# Patient Record
Sex: MALE | Race: WHITE | NOT HISPANIC OR LATINO | Employment: OTHER | URBAN - METROPOLITAN AREA
[De-identification: names, ages, dates, MRNs, and addresses within clinical notes are randomized per-mention and may not be internally consistent; named-entity substitution may affect disease eponyms.]

---

## 2017-05-23 DIAGNOSIS — N40.0 BPH (BENIGN PROSTATIC HYPERPLASIA): ICD-10-CM

## 2017-05-24 RX ORDER — FINASTERIDE 5 MG/1
TABLET, FILM COATED ORAL
Qty: 90 TABLET | Refills: 3 | Status: SHIPPED | OUTPATIENT
Start: 2017-05-24 | End: 2018-05-22

## 2017-06-14 DIAGNOSIS — C61 MALIGNANT NEOPLASM OF PROSTATE (H): Primary | ICD-10-CM

## 2017-06-14 RX ORDER — DIAZEPAM 10 MG
TABLET ORAL
Qty: 1 TABLET | Refills: 0 | Status: SHIPPED | OUTPATIENT
Start: 2017-06-14 | End: 2021-10-25

## 2017-08-29 ENCOUNTER — PRE VISIT (OUTPATIENT)
Dept: UROLOGY | Facility: CLINIC | Age: 62
End: 2017-08-29

## 2017-08-29 NOTE — TELEPHONE ENCOUNTER
Patient is coming in to see  for a cystoscopy for bladder cancer, called patient and left a message to please come with a full bladder.

## 2017-09-19 ENCOUNTER — OFFICE VISIT (OUTPATIENT)
Dept: UROLOGY | Facility: CLINIC | Age: 62
End: 2017-09-19

## 2017-09-19 VITALS
HEIGHT: 77 IN | HEART RATE: 52 BPM | DIASTOLIC BLOOD PRESSURE: 91 MMHG | SYSTOLIC BLOOD PRESSURE: 134 MMHG | BODY MASS INDEX: 27.51 KG/M2 | WEIGHT: 233 LBS

## 2017-09-19 DIAGNOSIS — C67.8 MALIGNANT NEOPLASM OF OVERLAPPING SITES OF BLADDER (H): Primary | ICD-10-CM

## 2017-09-19 ASSESSMENT — PAIN SCALES - GENERAL
PAINLEVEL: NO PAIN (0)
PAINLEVEL: NO PAIN (0)

## 2017-09-19 NOTE — PATIENT INSTRUCTIONS
Please make a phone appointment to go over pathology    It was a pleasure meeting with you today.  Thank you for allowing me and my team the privilege of caring for you today.  YOU are the reason we are here, and I truly hope we provided you with the excellent service you deserve.  Please let us know if there is anything else we can do for you so that we can be sure you are leaving completely satisfied with your care experience.

## 2017-09-19 NOTE — LETTER
9/19/2017       RE: Narendra Guadalupe  [address] [facility] [address]     Dear Colleague,    Thank you for referring your patient, Narendra Guadalupe, to the [facility] UROLOGY AND [facility] FOR PROSTATE AND UROLOGIC CANCERS at [facility]. Please see a copy of my visit note below.    REASON FOR VISIT TODAY:  Cystoscopy for history of bladder cancer and also history of prostate cancer.      HISTORY OF PRESENT ILLNESS:  Mr. Guadalupe is a 62-year-old gentleman followed in our clinic for a history of prostate cancer as well as bladder cancer.  The patient was diagnosed in 2011 with [place] 3 + 3 = 6 prostate cancer and is on active surveillance for this.  He was also diagnosed with high-grade superficial TA bladder cancer in 11/2014.  He had resection and subsequent induction course of BCG.  He has had no recurrence to date.  The patient is known to have a prostate that is quite enlarged with a prostate volume measured at 232 cc noted on 08/29/2016.  The patient comes in today noting no blood in his urine since we last saw him.      After informed consent was obtained, the patient was prepped and draped in a standard sterile fashion.  A flexible cystoscope was introduced into the patient's bladder without difficulty.  Inspection of the bladder revealed a trabeculated bladder with cellules and a very large intravesical component of prostate tissue.  Underneath this on the left side, there was a small lesion noted at the junction of the intravesical prostate and the bladder floor.  This was biopsied.  There were no other mucosal lesions, stones or foreign objects noted in the bladder.      ASSESSMENT AND PLAN:  I suggested to Mr. Guadalupe that we will see what the prostate biopsy shows.  The patient is also due for another PSA, and we will ask him to get this sometime in the next couple of weeks, but not before then as we just instrumented him today, and we do not want his PSA to  be falsely elevated.  We will then plan on having a phone appointment to go over the results of the PSA and to go over the results of his biopsy from today.  Mr. Cole is in agreement with the plan.         ANDRÉS PALACIO MD             D: 2017 16:11   T: 2017 11:16   MT: mark      Name:     CORNELIUS COLE   MRN:      [id number]        Account:      [id number]   :      1955           Service Date: 2017      Document: [id number]       Again, thank you for allowing me to participate in the care of your patient.      Sincerely,    Andrés Palacio MD

## 2017-09-19 NOTE — NURSING NOTE
Invasive Procedure Safety Checklist:    Procedure: cysto     Action: Complete sections and checkboxes as appropriate.    Pre-procedure:  1. Patient ID Verified with 2 identifiers (Minal and  or MRN) : YES    2. Procedure and site verified with patient/designee (when able) : YES    3. Accurate consent documentation in medical record : YES    4. H&P (or appropriate assessment) documented in medical record : NO  H&P must be up to 30 days prior to procedure an updated within 24 hours of                 Procedure as applicable.     5. Relevant diagnostic and radiology test results appropriately labeled and displayed as applicable : YES    6. Blood products, implants, devices, and/or special equipment available for the procedure as applicable : YES    7. Procedure site(s) marked with provider initials [Exclusions: none] : NO    8. Marking not required. Reason : Yes  Procedure does not require site marking    Time Out:     Time-Out performed immediately prior to starting procedure, including verbal and active participation of all team members addressing: YES    1. Correct patient identity.  2. Confirmed that the correct side and site are marked.  3. An accurate procedure to be done.  4. Agreement on the procedure to be done.  5. Correct patient position.  6. Relevant images and results are properly labeled and appropriately displayed.  7. The need to administer antibiotics or fluids for irrigation purposes during the procedure as applicable.  8. Safety precautions based on patient history or medication use.    During Procedure: Verification of correct person, site, and procedure occurs any time the responsibility for care of the patient is transferred to another member of the care team.

## 2017-09-19 NOTE — MR AVS SNAPSHOT
After Visit Summary   9/19/2017    Narendra Guadalupe    MRN: [id number]           Patient Information     Date Of Birth          1955        Visit Information        Provider Department      9/19/2017 9:30 AM Taz Chase MD [facility] Urology and [facility] for Prostate and Urologic Cancers        Today's Diagnoses     Malignant neoplasm of overlapping sites of bladder (H)    -  1      Care Instructions    Please make a phone appointment to go over pathology    It was a pleasure meeting with you today.  Thank you for allowing me and my team the privilege of caring for you today.  YOU are the reason we are here, and I truly hope we provided you with the excellent service you deserve.  Please let us know if there is anything else we can do for you so that we can be sure you are leaving completely satisfied with your care experience.                  Follow-ups after your visit        Your next 10 appointments already scheduled     Sep 25, 2017  4:00 PM CDT   Telephone Call with Taz Chase MD   [facility] Urology and [facility] for Prostate and Urologic Cancers ([facility])    [address] [place] [address] [phone]           Note: this is not an onsite visit; there is no need to come to the facility.              Who to contact     Please call your clinic at [phone] to:    Ask questions about your health    Make or cancel appointments    Discuss your medicines    Learn about your test results    Speak to your doctor   If you have compliments or concerns about an experience at your clinic, or if you wish to file a complaint, please contact [facility] Patient Relations at [phone] or email us at [email or web address]         Additional Information About Your Visit        MyChart Information     [employer] is an electronic gateway that provides easy, online access to your medical  "records. With Qulsar, you can request a clinic appointment, read your test results, renew a prescription or communicate with your care team.     To sign up for [employer]ulsar visit the website at [email or web address]   You will be asked to enter the access code listed below, as well as some personal information. Please follow the directions to create your username and password.     Your access code is: [email or web address]  Expires: 2017  6:30 AM     Your access code will  in 90 days. If you need help or a new code, please contact your [facility] or call [phone] for assistance.        Care EveryWhere ID     This is your Care EveryWhere ID. This could be used by other organizations to access your [place] medical records  [id number]        Your Vitals Were     Pulse Height BMI (Body Mass Index)             52 1.956 m (6' 5\") 27.63 kg/m2          Blood Pressure from Last 3 Encounters:   17 (!) 134/91   11/15/16 136/81   16 124/73    Weight from Last 3 Encounters:   17 105.7 kg (233 lb)   11/15/16 106.3 kg (234 lb 4.8 oz)   16 101.2 kg (223 lb)              We Performed the Following     Cystoscopy w/ Biopsy (62677)     Cytology non gyn     Surgical pathology exam        Primary Care Provider Office Phone # Fax #    Lindseyonur Leo [phone]       [address] [address]        Equal Access to Services     JOANNE SANCHEZ : Hadii francy hernandezo Soroberta, waaxda luqadaha, qaybta kaalmada adejaime, mathew willingham. So [facility] [phone].    ATENCIÓN: Si habla español, tiene a bustillos disposición servicios gratuitos de asistencia lingüística. Llame al [phone].    We comply with applicable federal civil rights laws and [place] laws. We do not discriminate on the basis of race, color, national origin, age, disability sex, sexual orientation or gender identity.            Thank you!     " Thank you for choosing [facility] UROLOGY AND [facility] FOR PROSTATE AND UROLOGIC CANCERS  for your care. Our goal is always to provide you with excellent care. Hearing back from our patients is one way we can continue to improve our services. Please take a few minutes to complete the written survey that you may receive in the mail after your visit with us. Thank you!             Your Updated Medication List - Protect others around you: Learn how to safely use, store and throw away your medicines at www.disposemymeds.org.          This list is accurate as of: 9/19/17 11:59 PM.  Always use your most recent med list.                   Brand Name Dispense Instructions for use Diagnosis    diazepam 10 MG tablet    VALIUM    1 tablet    Take 30-60 minutes before procedure.  Do not operate a vehicle after taking this medication.    Malignant neoplasm of prostate (H)       finasteride 5 MG tablet    PROSCAR    90 tablet    TAKE 1 TABLET (5 MG) BY MOUTH DAILY    BPH (benign prostatic hyperplasia)       tamsulosin 0.4 MG capsule    FLOMAX    60 capsule    TAKE 1 CAPSULE (0.4 MG) BY MOUTH DAILY    Malignant neoplasm of prostate (H)       ZOLOFT 50 MG tablet   Generic drug:  sertraline      Take 50 mg by mouth daily. Half tab

## 2017-09-20 LAB
COPATH REPORT: NORMAL
COPATH REPORT: NORMAL

## 2017-09-20 NOTE — PROGRESS NOTES
REASON FOR VISIT TODAY:  Cystoscopy for history of bladder cancer and also history of prostate cancer.      HISTORY OF PRESENT ILLNESS:  Mr. Cole is a 62-year-old gentleman followed in our clinic for a history of prostate cancer as well as bladder cancer.  The patient was diagnosed in 2011 with [place] 3 + 3 = 6 prostate cancer and is on active surveillance for this.  He was also diagnosed with high-grade superficial TA bladder cancer in 11/2014.  He had resection and subsequent induction course of BCG.  He has had no recurrence to date.  The patient is known to have a prostate that is quite enlarged with a prostate volume measured at 232 cc noted on 08/29/2016.  The patient comes in today noting no blood in his urine since we last saw him.      After informed consent was obtained, the patient was prepped and draped in a standard sterile fashion.  A flexible cystoscope was introduced into the patient's bladder without difficulty.  Inspection of the bladder revealed a trabeculated bladder with cellules and a very large intravesical component of prostate tissue.  Underneath this on the left side, there was a small lesion noted at the junction of the intravesical prostate and the bladder floor.  This was biopsied.  There were no other mucosal lesions, stones or foreign objects noted in the bladder.      ASSESSMENT AND PLAN:  I suggested to Mr. Cole that we will see what the prostate biopsy shows.  The patient is also due for another PSA, and we will ask him to get this sometime in the next couple of weeks, but not before then as we just instrumented him today, and we do not want his PSA to be falsely elevated.  We will then plan on having a phone appointment to go over the results of the PSA and to go over the results of his biopsy from today.  Mr. Cole is in agreement with the plan.         ANDRÉS PALACIO MD             D: 09/19/2017 16:11   T: 09/20/2017 11:16   MT: mark      Name:     CORNELIUS COLE   MRN:       [id number]        Account:      [id number]   :      1955           Service Date: 2017      Document: [id number]

## 2017-09-25 ENCOUNTER — VIRTUAL VISIT (OUTPATIENT)
Dept: UROLOGY | Facility: CLINIC | Age: 62
End: 2017-09-25

## 2017-09-25 DIAGNOSIS — C61 MALIGNANT NEOPLASM OF PROSTATE (H): ICD-10-CM

## 2017-09-25 DIAGNOSIS — Z85.51 PERSONAL HISTORY OF MALIGNANT NEOPLASM OF BLADDER: Primary | ICD-10-CM

## 2018-02-07 DIAGNOSIS — C61 MALIGNANT NEOPLASM OF PROSTATE (H): ICD-10-CM

## 2018-02-07 RX ORDER — TAMSULOSIN HYDROCHLORIDE 0.4 MG/1
CAPSULE ORAL
Qty: 60 CAPSULE | Refills: 6 | Status: SHIPPED | OUTPATIENT
Start: 2018-02-07 | End: 2019-02-11

## 2018-03-30 DIAGNOSIS — C61 MALIGNANT NEOPLASM OF PROSTATE (H): ICD-10-CM

## 2018-03-30 DIAGNOSIS — F41.9 ANXIETY: Primary | ICD-10-CM

## 2018-03-30 RX ORDER — DIAZEPAM 10 MG
10 TABLET ORAL EVERY 6 HOURS PRN
Qty: 2 TABLET | Refills: 0
Start: 2018-03-30 | End: 2019-04-10

## 2018-04-02 DIAGNOSIS — C61 MALIGNANT NEOPLASM OF PROSTATE (H): ICD-10-CM

## 2018-04-02 LAB — PSA SERPL-MCNC: 4.48 UG/L (ref 0–4)

## 2018-04-05 ENCOUNTER — TELEPHONE (OUTPATIENT)
Dept: UROLOGY | Facility: CLINIC | Age: 63
End: 2018-04-05

## 2018-04-18 ENCOUNTER — RADIANT APPOINTMENT (OUTPATIENT)
Dept: MRI IMAGING | Facility: CLINIC | Age: 63
End: 2018-04-18
Attending: UROLOGY
Payer: COMMERCIAL

## 2018-04-18 DIAGNOSIS — C61 MALIGNANT NEOPLASM OF PROSTATE (H): ICD-10-CM

## 2018-04-18 LAB
CREAT BLD-MCNC: 0.8 MG/DL (ref 0.5–1.2)
GFR SERPL CREATININE-BSD FRML MDRD: NORMAL ML/MIN/{1.73_M2}
GFRB: NORMAL

## 2018-04-18 RX ORDER — GADOBUTROL 604.72 MG/ML
0.1 INJECTION INTRAVENOUS ONCE
Status: COMPLETED | OUTPATIENT
Start: 2018-04-18 | End: 2018-04-18

## 2018-04-18 RX ADMIN — GADOBUTROL 10 ML: 604.72 INJECTION INTRAVENOUS at 11:36

## 2018-04-25 ENCOUNTER — PRE VISIT (OUTPATIENT)
Dept: UROLOGY | Facility: CLINIC | Age: 63
End: 2018-04-25

## 2018-04-25 NOTE — TELEPHONE ENCOUNTER
Patient is coming in to see  for a cystoscopy for bladder cancer, called patient to please come with a full bladder for a urine test.

## 2018-05-08 ENCOUNTER — OFFICE VISIT (OUTPATIENT)
Dept: UROLOGY | Facility: CLINIC | Age: 63
End: 2018-05-08
Payer: COMMERCIAL

## 2018-05-08 VITALS
HEIGHT: 77 IN | DIASTOLIC BLOOD PRESSURE: 85 MMHG | HEART RATE: 65 BPM | WEIGHT: 226 LBS | SYSTOLIC BLOOD PRESSURE: 132 MMHG | BODY MASS INDEX: 26.68 KG/M2

## 2018-05-08 DIAGNOSIS — C67.8 MALIGNANT NEOPLASM OF OVERLAPPING SITES OF BLADDER (H): Primary | ICD-10-CM

## 2018-05-08 ASSESSMENT — PAIN SCALES - GENERAL
PAINLEVEL: NO PAIN (0)
PAINLEVEL: NO PAIN (0)

## 2018-05-08 NOTE — LETTER
5/8/2018       RE: Narendra Guadalupe  [address] [facility] [address]     Dear Colleague,    Thank you for referring your patient, Narendra Guadalupe, to the [facility] UROLOGY AND [facility] FOR PROSTATE AND UROLOGIC CANCERS at [facility]. Please see a copy of my visit note below.    Service Date: 05/08/2018      REASON FOR VISIT TODAY:  Prostate cancer and bladder cancer.      HISTORY OF PRESENT ILLNESS:  Mr. uGadalupe is a 63-year-old gentleman followed in our clinic for history of a [place] 3+3 equals 6 prostate cancer diagnosed in 2011 for which he is on active surveillance.  He also was diagnosed with high-grade superficial Ta bladder cancer in 11/2014.  He had resection with BCG.  He has had no recurrence to date.  This prostate volume was noted to be 232 cc on MRI on 08/29/2016.  The patient comes in today for surveillance cystoscopy.  He has noted no blood in his urine since we last saw him.  The patient also recently underwent an MRI of the prostate as part of his prostate cancer surveillance.      PROCEDURE:  After informed consent was obtained, the patient was prepped and draped in standard sterile fashion.  A flexible cystoscope was introduced into the patient's bladder without difficulty.  Inspection of the bladder revealed a markedly enlarged prostate with a very large intravesical lobe of prostate tissue protruding at the bladder neck as has been seen before.  There were trabeculations noted in the bladder.  There were 2 tiny mucosal lesions at the base of the median lobe where it joins the bladder.  These have been biopsied previously and were negative for cancer, and thus they were not biopsied again today.  There were no mucosal lesions concerning for cancer, stones or foreign objects in the bladder.      The patient's PSA from 04/02/2018 was 4.48, which is 9.96 when adjusted for finasteride.  This compares to a value of 13.8 from the patient's previous  value.  The patient's MRI from 2018 demonstrates that the prostate is now only measuring 156 grams compared to 232 previously.  There is a 4 mm PI-RADS 3 lesion noted in the peripheral zone near the midline in the mid gland which is new, otherwise only PI-RADS 2 lesions noted.      ASSESSMENT AND PLAN:  I suggested to Mr. Cole that we are pleased to see his PSA is stable, if not lower, and his MRI does not show any highly suspicious lesions.  In fact, the only PI-RADS 3 lesion that is present is very small and it is not clear we could accurately target this lesion in any case.  Given all of these concerns, our plan will simply be to see the patient back in 6 months for another surveillance cystoscopy.  He will have a PSA at that time.  We will likely then plan on a surveillance prostate biopsy sometime in the fall or early winter, as that will be 3 years from the patient's previous biopsy.  Mr. Cole is in agreement with the plan.         ANDRÉS PALACIO MD             D: 2018   T: 2018   MT: KINGA      Name:     CORNELIUS COLE   MRN:      [id number]        Account:      [id number]   :      1955           Service Date: 2018      Document: [id number]

## 2018-05-08 NOTE — PATIENT INSTRUCTIONS
Please follow up in 6 month for a cystoscopy    It was a pleasure meeting with you today.  Thank you for allowing me and my team the privilege of caring for you today.  YOU are the reason we are here, and I truly hope we provided you with the excellent service you deserve.  Please let us know if there is anything else we can do for you so that we can be sure you are leaving completely satisfied with your care experience.

## 2018-05-08 NOTE — NURSING NOTE
Invasive Procedure Safety Checklist:    Procedure: cysto     Action: Complete sections and checkboxes as appropriate.    Pre-procedure:  1. Patient ID Verified with 2 identifiers (Minal and  or MRN) : YES    2. Procedure and site verified with patient/designee (when able) : YES    3. Accurate consent documentation in medical record : YES    4. H&P (or appropriate assessment) documented in medical record : NO  H&P must be up to 30 days prior to procedure an updated within 24 hours of                 Procedure as applicable.     5. Relevant diagnostic and radiology test results appropriately labeled and displayed as applicable : NO    6. Blood products, implants, devices, and/or special equipment available for the procedure as applicable : NO    7. Procedure site(s) marked with provider initials [Exclusions: none] : NO    8. Marking not required. Reason : Yes  Procedure does not require site marking    Time Out:     Time-Out performed immediately prior to starting procedure, including verbal and active participation of all team members addressing: YES    1. Correct patient identity.  2. Confirmed that the correct side and site are marked.  3. An accurate procedure to be done.  4. Agreement on the procedure to be done.  5. Correct patient position.  6. Relevant images and results are properly labeled and appropriately displayed.  7. The need to administer antibiotics or fluids for irrigation purposes during the procedure as applicable.  8. Safety precautions based on patient history or medication use.    During Procedure: Verification of correct person, site, and procedure occurs any time the responsibility for care of the patient is transferred to another member of the care team.

## 2018-05-08 NOTE — NURSING NOTE
The following medication was given:     MEDICATION:  Lidocaine 2%  ROUTE: Topical   SITE: Urethal  DOSE: 20mg  LOT #: [id number]  : Limited   EXPIRATION DATE: 1/20  NDC#: [id number]   Was there drug waste? Ashia Black CMA  May 8, 2018

## 2018-05-08 NOTE — MR AVS SNAPSHOT
After Visit Summary   5/8/2018    Narendra Guadalupe    MRN: [id number]           Patient Information     Date Of Birth          1955        Visit Information        Provider Department      5/8/2018 12:00 PM Taz Chase MD [facility] Urology and [facility] for Prostate and Urologic Cancers        Today's Diagnoses     Malignant neoplasm of overlapping sites of bladder (H)    -  1      Care Instructions    Please follow up in 6 month for a cystoscopy    It was a pleasure meeting with you today.  Thank you for allowing me and my team the privilege of caring for you today.  YOU are the reason we are here, and I truly hope we provided you with the excellent service you deserve.  Please let us know if there is anything else we can do for you so that we can be sure you are leaving completely satisfied with your care experience.                   Follow-ups after your visit        Your next 10 appointments already scheduled     Nov 13, 2018 11:30 AM CST   (Arrive by 11:15 AM)   Cystoscopy with Taz Chase MD   [facility] Urology and [facility] for Prostate and Urologic Cancers ([facility])    [address] [place] [address] [phone]              Who to contact     Please call your clinic at [phone] to:    Ask questions about your health    Make or cancel appointments    Discuss your medicines    Learn about your test results    Speak to your doctor            Additional Information About Your Visit        MyChart Information     [employer] is an electronic gateway that provides easy, online access to your medical records. With [employer], you can request a clinic appointment, read your test results, renew a prescription or communicate with your care team.     To sign up for [employer] visit the website at [email or web address]   You will be asked to enter the access code listed below, as well as some personal information. Please  "follow the directions to create your username and password.     Your access code is: [email or web address]  Expires: 2018  6:30 AM     Your access code will  in 90 days. If you need help or a new code, please contact your [facility] or call [phone] for assistance.        Care EveryWhere ID     This is your Care EveryWhere ID. This could be used by other organizations to access your [place] medical records  [id number]        Your Vitals Were     Pulse Height BMI (Body Mass Index)             65 1.956 m (6' 5\") 26.8 kg/m2          Blood Pressure from Last 3 Encounters:   18 132/85   17 (!) 134/91   11/15/16 136/81    Weight from Last 3 Encounters:   18 102.5 kg (226 lb)   17 105.7 kg (233 lb)   11/15/16 106.3 kg (234 lb 4.8 oz)              We Performed the Following     Cytology non gyn        Primary Care Provider Office Phone # Fax #    Lindsey Leo [phone]       [place] [address]        Equal Access to Services     JOANNE SANCHEZ AH: Hadvikas Polanco, waautumnda rachael, qaybta kaalmada celia, mathew willingham. So [facility] [phone].    ATENCIÓN: Si habla español, tiene a bustillos disposición servicios gratuitos de asistencia lingüística. Llame al [phone].    We comply with applicable federal civil rights laws and [place] laws. We do not discriminate on the basis of race, color, national origin, age, disability, sex, sexual orientation, or gender identity.            Thank you!     Thank you for choosing [facility] UROLOGY AND [facility] FOR PROSTATE AND UROLOGIC CANCERS  for your care. Our goal is always to provide you with excellent care. Hearing back from our patients is one way we can continue to improve our services. Please take a few minutes to complete the written survey that you may receive in the mail after your visit with us. Thank you!             Your " Updated Medication List - Protect others around you: Learn how to safely use, store and throw away your medicines at www.disposemymeds.org.          This list is accurate as of 5/8/18 12:59 PM.  Always use your most recent med list.                   Brand Name Dispense Instructions for use Diagnosis    * diazepam 10 MG tablet    VALIUM    1 tablet    Take 30-60 minutes before procedure.  Do not operate a vehicle after taking this medication.    Malignant neoplasm of prostate (H)       * diazepam 10 MG tablet    VALIUM    2 tablet    Take 1 tablet (10 mg) by mouth every 6 hours as needed for anxiety or sleep Take 30-60 minutes before procedure.  Do not operate a vehicle after taking this medication.    Anxiety       finasteride 5 MG tablet    PROSCAR    90 tablet    TAKE 1 TABLET (5 MG) BY MOUTH DAILY    BPH (benign prostatic hyperplasia)       tamsulosin 0.4 MG capsule    FLOMAX    60 capsule    TAKE 1 CAPSULE (0.4 MG) BY MOUTH DAILY    Malignant neoplasm of prostate (H)       ZOLOFT 50 MG tablet   Generic drug:  sertraline      Take 50 mg by mouth daily. Half tab        * Notice:  This list has 2 medication(s) that are the same as other medications prescribed for you. Read the directions carefully, and ask your doctor or other care provider to review them with you.

## 2018-05-09 LAB — COPATH REPORT: NORMAL

## 2018-05-09 NOTE — PROGRESS NOTES
Service Date: 05/08/2018      REASON FOR VISIT TODAY:  Prostate cancer and bladder cancer.      HISTORY OF PRESENT ILLNESS:  Mr. Guadalupe is a 63-year-old gentleman followed in our clinic for history of a [place] 3+3 equals 6 prostate cancer diagnosed in 2011 for which he is on active surveillance.  He also was diagnosed with high-grade superficial Ta bladder cancer in 11/2014.  He had resection with BCG.  He has had no recurrence to date.  This prostate volume was noted to be 232 cc on MRI on 08/29/2016.  The patient comes in today for surveillance cystoscopy.  He has noted no blood in his urine since we last saw him.  The patient also recently underwent an MRI of the prostate as part of his prostate cancer surveillance.      PROCEDURE:  After informed consent was obtained, the patient was prepped and draped in standard sterile fashion.  A flexible cystoscope was introduced into the patient's bladder without difficulty.  Inspection of the bladder revealed a markedly enlarged prostate with a very large intravesical lobe of prostate tissue protruding at the bladder neck as has been seen before.  There were trabeculations noted in the bladder.  There were 2 tiny mucosal lesions at the base of the median lobe where it joins the bladder.  These have been biopsied previously and were negative for cancer, and thus they were not biopsied again today.  There were no mucosal lesions concerning for cancer, stones or foreign objects in the bladder.      The patient's PSA from 04/02/2018 was 4.48, which is 9.96 when adjusted for finasteride.  This compares to a value of 13.8 from the patient's previous value.  The patient's MRI from 04/18/2018 demonstrates that the prostate is now only measuring 156 grams compared to 232 previously.  There is a 4 mm PI-RADS 3 lesion noted in the peripheral zone near the midline in the mid gland which is new, otherwise only PI-RADS 2 lesions noted.      ASSESSMENT AND PLAN:  I suggested to   Anayeli that we are pleased to see his PSA is stable, if not lower, and his MRI does not show any highly suspicious lesions.  In fact, the only PI-RADS 3 lesion that is present is very small and it is not clear we could accurately target this lesion in any case.  Given all of these concerns, our plan will simply be to see the patient back in 6 months for another surveillance cystoscopy.  He will have a PSA at that time.  We will likely then plan on a surveillance prostate biopsy sometime in the fall or early winter, as that will be 3 years from the patient's previous biopsy.  Mr. Cole is in agreement with the plan.         ANDRÉS PALACIO MD             D: 2018   T: 2018   MT: KINGA      Name:     CORNELIUS COLE   MRN:      [id number]        Account:      [id number]   :      1955           Service Date: 2018      Document: [id number]

## 2018-05-22 DIAGNOSIS — N40.0 BPH (BENIGN PROSTATIC HYPERPLASIA): ICD-10-CM

## 2018-05-25 RX ORDER — FINASTERIDE 5 MG/1
5 TABLET, FILM COATED ORAL DAILY
Qty: 90 TABLET | Refills: 3 | Status: SHIPPED | OUTPATIENT
Start: 2018-05-25 | End: 2019-04-27

## 2018-10-29 ENCOUNTER — PRE VISIT (OUTPATIENT)
Dept: UROLOGY | Facility: CLINIC | Age: 63
End: 2018-10-29

## 2018-11-13 ENCOUNTER — TRANSFERRED RECORDS (OUTPATIENT)
Dept: HEALTH INFORMATION MANAGEMENT | Facility: CLINIC | Age: 63
End: 2018-11-13

## 2018-11-13 ENCOUNTER — OFFICE VISIT (OUTPATIENT)
Dept: UROLOGY | Facility: CLINIC | Age: 63
End: 2018-11-13
Payer: COMMERCIAL

## 2018-11-13 ENCOUNTER — PATIENT OUTREACH (OUTPATIENT)
Dept: CARE COORDINATION | Facility: CLINIC | Age: 63
End: 2018-11-13

## 2018-11-13 VITALS
HEART RATE: 61 BPM | WEIGHT: 216 LBS | DIASTOLIC BLOOD PRESSURE: 86 MMHG | HEIGHT: 77 IN | BODY MASS INDEX: 25.5 KG/M2 | SYSTOLIC BLOOD PRESSURE: 126 MMHG

## 2018-11-13 DIAGNOSIS — C67.8 MALIGNANT NEOPLASM OF OVERLAPPING SITES OF BLADDER (H): Primary | ICD-10-CM

## 2018-11-13 ASSESSMENT — PAIN SCALES - GENERAL
PAINLEVEL: NO PAIN (0)
PAINLEVEL: NO PAIN (0)

## 2018-11-13 NOTE — NURSING NOTE
Invasive Procedure Safety Checklist:    Procedure: cysto     Action: Complete sections and checkboxes as appropriate.    Pre-procedure:  1. Patient ID Verified with 2 identifiers ([place] and  or MRN) : YES    2. Procedure and site verified with patient/designee (when able) : YES    3. Accurate consent documentation in medical record : YES    4. H&P (or appropriate assessment) documented in medical record : YES  H&P must be up to 30 days prior to procedure an updated within 24 hours of                 Procedure as applicable.     5. Relevant diagnostic and radiology test results appropriately labeled and displayed as applicable : NO    6. Blood products, implants, devices, and/or special equipment available for the procedure as applicable : NO    7. Procedure site(s) marked with provider initials [Exclusions: none] : NO    8. Marking not required. Reason : Yes  Procedure does not require site marking    Time Out:     Time-Out performed immediately prior to starting procedure, including verbal and active participation of all team members addressing: YES    1. Correct patient identity.  2. Confirmed that the correct side and site are marked.  3. An accurate procedure to be done.  4. Agreement on the procedure to be done.  5. Correct patient position.  6. Relevant images and results are properly labeled and appropriately displayed.  7. The need to administer antibiotics or fluids for irrigation purposes during the procedure as applicable.  8. Safety precautions based on patient history or medication use.    During Procedure: Verification of correct person, site, and procedure occurs any time the responsibility for care of the patient is transferred to another member of the care team.

## 2018-11-13 NOTE — NURSING NOTE
The following medication was given:     MEDICATION:  Lidocaine   ROUTE: topical  SITE: Urethra   DOSE: 20mg  LOT #: [id number]  : Limited  EXPIRATION DATE: 6/20  NDC#: 9120228582   Was there drug waste? Ashia Black CMA  November 13, 2018

## 2018-11-13 NOTE — LETTER
11/13/2018       RE: Narendra Guadalupe  [address] [facility] [address]     Dear Colleague,    Thank you for referring your patient, Narendra Guadalupe, to the [facility] UROLOGY AND [facility] FOR PROSTATE AND UROLOGIC CANCERS at [facility]. Please see a copy of my visit note below.    Service Date: 11/13/2018      REASON FOR VISIT TODAY:  Bladder cancer and prostate cancer.      HISTORY OF PRESENT ILLNESS:  Mr. Guadalupe is a 63-year-old gentleman followed in our clinic for history of [place] 3+3 prostate cancer diagnosed in 2011 for which he is on active surveillance.  He was also diagnosed with high-grade Ta bladder cancer in 11/2014 for which he had a resection followed by BCG with no evidence of recurrence to date.  The patient's prostate volume has been measured a size 232 on an MRI on 08/29/2016 with more recent values measured at 156 in May of this year.  The patient presents today for surveillance cystoscopy.        After informed consent was obtained, the patient was prepped and draped in standard sterile fashion.  A flexible cystoscope was introduced into the patient's bladder without difficulty.  Inspection of the bladder revealed some mild to moderate trabeculation.  The UOs were orthotopic.  There were no mucosal lesions noted in the bladder.  There was 1 tiny bladder stone noted.  On retroflexion, the patient again demonstrates a large volume of prostate tissue protruding into the bladder consistent with his known BPH.  There was no evidence of recurrence on today's cystoscopy.      ASSESSMENT AND PLAN:  Mr. Guadalupe left a urine today which will be sent for cytology and Cxbladder testing and he will call us next week for the result.  Otherwise, the patient will get a PSA drawn this fall and we will have a phone appointment to go over the results.  Otherwise, the patient can go to annual cystoscopies as he is out now 4 years from his presentation.          ANDRÉS PALACIO MD             D: 2018   T: 2018   MT: luisa      Name:     CORNELIUS COLE   MRN:      [id number]        Account:      [id number]   :      1955           Service Date: 2018      Document: [id number]

## 2018-11-13 NOTE — MR AVS SNAPSHOT
After Visit Summary   2018    Narendra Guadalupe    MRN: [id number]           Patient Information     Date Of Birth          1955        Visit Information        Provider Department      2018 11:30 AM Taz Chase MD [facility] Urology and [facility] for Prostate and Urologic Cancers        Today's Diagnoses     Malignant neoplasm of overlapping sites of bladder (H)    -  1      Care Instructions    Get PSA done in December and make a phone appointment     It was a pleasure meeting with you today.  Thank you for allowing me and my team the privilege of caring for you today.  YOU are the reason we are here, and I truly hope we provided you with the excellent service you deserve.  Please let us know if there is anything else we can do for you so that we can be sure you are leaving completely satisfied with your care experience.                  Follow-ups after your visit        Who to contact     Please call your clinic at [phone] to:    Ask questions about your health    Make or cancel appointments    Discuss your medicines    Learn about your test results    Speak to your doctor            Additional Information About Your Visit        MyChart Information     ciValue is an electronic gateway that provides easy, online access to your medical records. With ciValue, you can request a clinic appointment, read your test results, renew a prescription or communicate with your care team.     To sign up for ciValue visit the website at [email or web address]   You will be asked to enter the access code listed below, as well as some personal information. Please follow the directions to create your username and password.     Your access code is: [email or web address]  Expires: 2019  5:31 AM     Your access code will  in 90 days. If you need help or a new code, please contact your [facility] or call [phone] for assistance.        Care  "EveryWhere ID     This is your Care EveryWhere ID. This could be used by other organizations to access your [place] medical records  [id number]        Your Vitals Were     Pulse Height BMI (Body Mass Index)             61 1.956 m (6' 5\") 25.61 kg/m2          Blood Pressure from Last 3 Encounters:   11/13/18 126/86   05/08/18 132/85   09/19/17 (!) 134/91    Weight from Last 3 Encounters:   11/13/18 98 kg (216 lb)   05/08/18 102.5 kg (226 lb)   09/19/17 105.7 kg (233 lb)              We Performed the Following     CX bladder: Laboratory Miscellaneous Order     CYSTOURETHROSCOPY     Cytology non gyn     Send outs misc test        Primary Care Provider Office Phone # Fax #    Lindsey Leo [phone]       [place] [address]        Equal Access to Services     JOANNE SANCHEZ : Hadii francy hernandezo Soroberta, waaxda luqadaha, qaybta kaalmada adehillyada, mathew ramirez . So [facility] [phone].    ATENCIÓN: Si habla español, tiene a bustillos disposición servicios gratuitos de asistencia lingüística. Gigi al [phone].    We comply with applicable federal civil rights laws and [place] laws. We do not discriminate on the basis of race, color, national origin, age, disability, sex, sexual orientation, or gender identity.            Thank you!     Thank you for choosing [facility] UROLOGY AND [facility] FOR PROSTATE AND UROLOGIC CANCERS  for your care. Our goal is always to provide you with excellent care. Hearing back from our patients is one way we can continue to improve our services. Please take a few minutes to complete the written survey that you may receive in the mail after your visit with us. Thank you!             Your Updated Medication List - Protect others around you: Learn how to safely use, store and throw away your medicines at www.disposemymeds.org.          This list is accurate as of 11/13/18 11:59 PM.  Always use your most recent med " list.                   Brand Name Dispense Instructions for use Diagnosis    * diazepam 10 MG tablet    VALIUM    1 tablet    Take 30-60 minutes before procedure.  Do not operate a vehicle after taking this medication.    Malignant neoplasm of prostate (H)       * diazepam 10 MG tablet    VALIUM    2 tablet    Take 1 tablet (10 mg) by mouth every 6 hours as needed for anxiety or sleep Take 30-60 minutes before procedure.  Do not operate a vehicle after taking this medication.    Anxiety       finasteride 5 MG tablet    PROSCAR    90 tablet    Take 1 tablet (5 mg) by mouth daily    BPH (benign prostatic hyperplasia)       tamsulosin 0.4 MG capsule    FLOMAX    60 capsule    TAKE 1 CAPSULE (0.4 MG) BY MOUTH DAILY    Malignant neoplasm of prostate (H)       ZOLOFT 50 MG tablet   Generic drug:  sertraline      Take 50 mg by mouth daily. Half tab        * Notice:  This list has 2 medication(s) that are the same as other medications prescribed for you. Read the directions carefully, and ask your doctor or other care provider to review them with you.

## 2018-11-13 NOTE — PATIENT INSTRUCTIONS
Get PSA done in December and make a phone appointment     It was a pleasure meeting with you today.  Thank you for allowing me and my team the privilege of caring for you today.  YOU are the reason we are here, and I truly hope we provided you with the excellent service you deserve.  Please let us know if there is anything else we can do for you so that we can be sure you are leaving completely satisfied with your care experience.

## 2018-11-13 NOTE — PROGRESS NOTES
Service Date: 2018      REASON FOR VISIT TODAY:  Bladder cancer and prostate cancer.      HISTORY OF PRESENT ILLNESS:  Mr. Cole is a 63-year-old gentleman followed in our clinic for history of [place] 3+3 prostate cancer diagnosed in  for which he is on active surveillance.  He was also diagnosed with high-grade Ta bladder cancer in 2014 for which he had a resection followed by BCG with no evidence of recurrence to date.  The patient's prostate volume has been measured a size 232 on an MRI on 2016 with more recent values measured at 156 in May of this year.  The patient presents today for surveillance cystoscopy.        After informed consent was obtained, the patient was prepped and draped in standard sterile fashion.  A flexible cystoscope was introduced into the patient's bladder without difficulty.  Inspection of the bladder revealed some mild to moderate trabeculation.  The UOs were orthotopic.  There were no mucosal lesions noted in the bladder.  There was 1 tiny bladder stone noted.  On retroflexion, the patient again demonstrates a large volume of prostate tissue protruding into the bladder consistent with his known BPH.  There was no evidence of recurrence on today's cystoscopy.      ASSESSMENT AND PLAN:  Mr. Cole left a urine today which will be sent for cytology and Cxbladder testing and he will call us next week for the result.  Otherwise, the patient will get a PSA drawn this fall and we will have a phone appointment to go over the results.  Otherwise, the patient can go to annual cystoscopies as he is out now 4 years from his presentation.         ANDRÉS PALACIO MD             D: 2018   T: 2018   MT: luisa      Name:     CORNELIUS COLE   MRN:      [id number]        Account:      [id number]   :      1955           Service Date: 2018      Document: [id number]

## 2018-11-14 LAB
COPATH REPORT: NORMAL
MISCELLANEOUS TEST: NORMAL

## 2018-11-20 LAB — LAB SCANNED RESULT: ABNORMAL

## 2019-01-31 ENCOUNTER — TELEPHONE (OUTPATIENT)
Dept: UROLOGY | Facility: CLINIC | Age: 64
End: 2019-01-31

## 2019-01-31 DIAGNOSIS — C61 MALIGNANT NEOPLASM OF PROSTATE (H): Primary | ICD-10-CM

## 2019-01-31 NOTE — TELEPHONE ENCOUNTER
ONURM for pt to call to schedule a phone appt with Dr. Chase to review PSA labs.  Make labs 1 week prior to appt.

## 2019-02-11 DIAGNOSIS — C61 MALIGNANT NEOPLASM OF PROSTATE (H): ICD-10-CM

## 2019-02-12 RX ORDER — TAMSULOSIN HYDROCHLORIDE 0.4 MG/1
0.4 CAPSULE ORAL DAILY
Qty: 60 CAPSULE | Refills: 6 | Status: SHIPPED | OUTPATIENT
Start: 2019-02-12 | End: 2020-04-30

## 2019-02-18 NOTE — TELEPHONE ENCOUNTER
2nd attempt to contact pt. M for pt to call to schedule a phone appt with Dr. Chase to review PSA labs.  Make labs 1 week prior to appt.

## 2019-03-13 NOTE — TELEPHONE ENCOUNTER
[facility] Call Center    Phone Message    May a detailed message be left on voicemail: yes    Reason for Call: Other: Zohaib calling to follow up on the messages left to have his PSA checked followed by an appointment with Dr Chase. Dr Chase's first in clinic appointment wasn't until June, and his first telephone appointment wasn't until mid-April.  Zohaib states that he is just fine with email communication to discuss the PSA labs.  He plans on getting the labs done tomorrow morning (3/14).  Please call Zohaib to schedule an appointment (either in clinic or via telephone) if Dr. Chase does not want to communicate with him via email about his results    Action Taken: Message routed to:  [facility]  Urology

## 2019-03-14 DIAGNOSIS — C61 MALIGNANT NEOPLASM OF PROSTATE (H): ICD-10-CM

## 2019-03-14 LAB — PSA SERPL-MCNC: 3.88 UG/L (ref 0–4)

## 2019-03-27 ENCOUNTER — VIRTUAL VISIT (OUTPATIENT)
Dept: UROLOGY | Facility: CLINIC | Age: 64
End: 2019-03-27
Payer: COMMERCIAL

## 2019-03-27 DIAGNOSIS — C61 PROSTATE CANCER (H): Primary | ICD-10-CM

## 2019-03-27 NOTE — PROGRESS NOTES
REASON FOR CALL TODAY:  Bladder cancer and prostate cancer.      HISTORY OF PRESENT ILLNESS:  Mr. Guadalupe is a 63-year-old gentleman followed in our clinic for history of [place] 3+3 prostate cancer diagnosed in 2011 for which he is on active surveillance.  He was also diagnosed with high-grade Ta bladder cancer in 11/2014 for which he had a resection followed by BCG with no evidence of recurrence to date.  The patient's prostate volume has been measured a size 232 on an MRI on 08/29/2016 with more recent values measured at 156 in May of this year.  He is on finasteride and flomax.  His last surveillance cysto was in 11/13/2018 with no evidence of recurrence.    OBJECTIVE: PSA from 3/14/19 is 3.88 ng/mL which is 7.76 ng/mL when adjusted for finasteride.    ASSESSMENT AND PLAN:  Over half of today's 5-minute visit was spent counseling the patient regarding his PSA.  We are pleased to see it has decreased.  We will see the patient back in the fall for his surveillance cyst and another PSA.  He is in agreement with the plan.

## 2019-04-10 ENCOUNTER — TELEPHONE (OUTPATIENT)
Dept: UROLOGY | Facility: CLINIC | Age: 64
End: 2019-04-10

## 2019-04-10 DIAGNOSIS — C61 MALIGNANT NEOPLASM OF PROSTATE (H): ICD-10-CM

## 2019-04-10 DIAGNOSIS — F41.9 ANXIETY: ICD-10-CM

## 2019-04-10 RX ORDER — DIAZEPAM 10 MG
10 TABLET ORAL EVERY 6 HOURS PRN
Qty: 2 TABLET | Refills: 0 | Status: SHIPPED | OUTPATIENT
Start: 2019-04-10 | End: 2021-10-25

## 2019-04-10 RX ORDER — DIAZEPAM 10 MG
TABLET ORAL
Qty: 2 TABLET | Refills: 0 | OUTPATIENT
Start: 2019-04-10

## 2019-04-10 NOTE — TELEPHONE ENCOUNTER
Valium called into pharmacy for patient having mri in a study protocol. Maren Worley LPN Staff Nurse

## 2019-04-10 NOTE — TELEPHONE ENCOUNTER
diazepam (VALIUM) 10 MG tablet      Last Written Prescription Date:  6-14-17  Last Fill Quantity: 1 tab,   # refills: 0  Last Office Visit : 111-13-18  Future Office visit:  none    Routing refill request to provider for review/approval because:  Controlled med    Additional Valium Rx on med lis

## 2019-04-10 NOTE — TELEPHONE ENCOUNTER
[facility] Call Center    Phone Message    May a detailed message be left on voicemail: yes    Reason for Call: Other: Pt called to let Maren know he's needing this Valium because he is getting an MRI done at Dr. Chase's suggestion. Please give him a call back.     Action Taken: Message routed to:  [facility] Urology

## 2019-04-27 DIAGNOSIS — N40.0 BPH (BENIGN PROSTATIC HYPERPLASIA): ICD-10-CM

## 2019-04-30 RX ORDER — FINASTERIDE 5 MG/1
5 TABLET, FILM COATED ORAL DAILY
Qty: 90 TABLET | Refills: 3 | Status: SHIPPED | OUTPATIENT
Start: 2019-04-30 | End: 2020-11-04

## 2019-12-11 ENCOUNTER — TELEPHONE (OUTPATIENT)
Dept: UROLOGY | Facility: CLINIC | Age: 64
End: 2019-12-11

## 2019-12-11 DIAGNOSIS — C61 PROSTATE CANCER (H): Primary | ICD-10-CM

## 2019-12-11 RX ORDER — DIAZEPAM 10 MG
10 TABLET ORAL
Qty: 1 TABLET | Refills: 0
Start: 2019-12-11 | End: 2021-10-25

## 2019-12-11 NOTE — TELEPHONE ENCOUNTER
Called in Valium 10mg  In to [facility]       ----- Message from Leatha Olson RN sent at 12/11/2019  8:31 AM CST -----  Ang,  Can you please call in a Valium 10 mg tablet for this patient for his upcoming MRI? He wants [facility] to [facility] at [address] in [place]. Tel [phone].  Thank you

## 2020-03-10 ENCOUNTER — PRE VISIT (OUTPATIENT)
Dept: UROLOGY | Facility: CLINIC | Age: 65
End: 2020-03-10

## 2020-03-10 NOTE — TELEPHONE ENCOUNTER
Chief Complaint : Bladder cancer cysto     Records/Orders: cytology     Pt Contacted: no    At Rooming: urine

## 2020-03-24 ENCOUNTER — TELEPHONE (OUTPATIENT)
Dept: UROLOGY | Facility: CLINIC | Age: 65
End: 2020-03-24

## 2020-03-24 NOTE — TELEPHONE ENCOUNTER
----- Message from Toya Black CMA sent at 3/24/2020 12:23 PM CDT -----  Please call patient and have him make a  cystoscopy appointment in 3 months with Dr.Warlick Dennis Pittman

## 2020-03-24 NOTE — TELEPHONE ENCOUNTER
Left message for pt to call and reschedule appt with Rena on 3/30 out 3 months (around 6/22) due to COVID-19.

## 2020-04-14 ENCOUNTER — TELEPHONE (OUTPATIENT)
Dept: UROLOGY | Facility: CLINIC | Age: 65
End: 2020-04-14

## 2020-04-15 ENCOUNTER — TELEPHONE (OUTPATIENT)
Dept: UROLOGY | Facility: CLINIC | Age: 65
End: 2020-04-15

## 2020-04-15 NOTE — TELEPHONE ENCOUNTER
Left message for pt to call and reschedule cysto on 6/22 with Warlick out 2 weeks to 7/6 at 9:30 in SON slot, per Leatha.

## 2020-04-15 NOTE — TELEPHONE ENCOUNTER
----- Message from Toya Black CMA sent at 4/15/2020  1:10 PM CDT -----  Yeah that's what mariana wanted   ----- Message -----  From: Sarah Montesinos  Sent: 4/14/2020   3:30 PM CDT  To: Toya Black CMA    Are we rescheduling the one on 6/22?     ----- Message -----  From: Toya Black CMA  Sent: 4/14/2020   1:38 PM CDT  To: [facility]    Please call this Patient and have him come in a cystoscopy with  on July 6 at 9:30    Dennis Pittman

## 2020-04-22 ENCOUNTER — TELEPHONE (OUTPATIENT)
Dept: UROLOGY | Facility: CLINIC | Age: 65
End: 2020-04-22

## 2020-04-22 ENCOUNTER — MYC MEDICAL ADVICE (OUTPATIENT)
Dept: UROLOGY | Facility: CLINIC | Age: 65
End: 2020-04-22

## 2020-04-22 NOTE — TELEPHONE ENCOUNTER
----- Message from Leatha Olson RN sent at 4/22/2020  9:49 AM CDT -----  Can we please call and get his updated insurance information in the system? Thank you

## 2020-04-29 ENCOUNTER — MYC MEDICAL ADVICE (OUTPATIENT)
Dept: UROLOGY | Facility: CLINIC | Age: 65
End: 2020-04-29

## 2020-04-29 ENCOUNTER — TRANSFERRED RECORDS (OUTPATIENT)
Dept: HEALTH INFORMATION MANAGEMENT | Facility: CLINIC | Age: 65
End: 2020-04-29

## 2020-04-30 DIAGNOSIS — C61 MALIGNANT NEOPLASM OF PROSTATE (H): ICD-10-CM

## 2020-04-30 RX ORDER — TAMSULOSIN HYDROCHLORIDE 0.4 MG/1
CAPSULE ORAL
Qty: 60 CAPSULE | Refills: 0 | OUTPATIENT
Start: 2020-04-30

## 2020-05-01 DIAGNOSIS — C61 MALIGNANT NEOPLASM OF PROSTATE (H): ICD-10-CM

## 2020-05-04 RX ORDER — TAMSULOSIN HYDROCHLORIDE 0.4 MG/1
CAPSULE ORAL
Qty: 60 CAPSULE | Refills: 0 | OUTPATIENT
Start: 2020-05-04

## 2020-05-11 DIAGNOSIS — C61 MALIGNANT NEOPLASM OF PROSTATE (H): Primary | ICD-10-CM

## 2020-05-11 RX ORDER — TAMSULOSIN HYDROCHLORIDE 0.4 MG/1
0.4 CAPSULE ORAL DAILY
Qty: 90 CAPSULE | Refills: 0 | Status: SHIPPED | OUTPATIENT
Start: 2020-05-11 | End: 2020-11-04

## 2020-05-11 NOTE — PROGRESS NOTES
Chief Complaint   Patient presents with     Refill Request     90 day supply of Tamsulosin 0.4 mg     Chacorta Tijerina MA

## 2020-05-13 DIAGNOSIS — C61 PROSTATE CANCER (H): ICD-10-CM

## 2020-05-13 LAB — PSA SERPL-MCNC: 3.68 UG/L (ref 0–4)

## 2020-05-27 ENCOUNTER — VIRTUAL VISIT (OUTPATIENT)
Dept: UROLOGY | Facility: CLINIC | Age: 65
End: 2020-05-27
Payer: MEDICARE

## 2020-05-27 DIAGNOSIS — C61 MALIGNANT NEOPLASM OF PROSTATE (H): Primary | ICD-10-CM

## 2020-05-27 ASSESSMENT — PAIN SCALES - GENERAL: PAINLEVEL: NO PAIN (0)

## 2020-05-27 NOTE — PROGRESS NOTES
"Narendra Guadalupe is a 65 year old male who is being evaluated via a billable telephone visit.      The patient has been notified of following:     \"This telephone visit will be conducted via a call between you and your physician/provider. We have found that certain health care needs can be provided without the need for a physical exam.  This service lets us provide the care you need with a short phone conversation.  If a prescription is necessary we can send it directly to your pharmacy.  If lab work is needed we can place an order for that and you can then stop by our lab to have the test done at a later time.    Telephone visits are billed at different rates depending on your insurance coverage. During this emergency period, for some insurers they may be billed the same as an in-person visit.  Please reach out to your insurance provider with any questions.    If during the course of the call the physician/provider feels a telephone visit is not appropriate, you will not be charged for this service.\"    Patient has given verbal consent for Telephone visit?  Yes    What phone number would you like to be contacted at? [phone]    How would you like to obtain your AVS? Lizzeth    ADDITIONAL PROVIDER NOTES:  REASON FOR CALL TODAY:  Bladder cancer and prostate cancer.      HISTORY OF PRESENT ILLNESS:  Mr. 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  He was also diagnosed with high-grade Ta bladder cancer in 11/2014 for which he had a resection followed by BCG with no evidence of recurrence to date.  The patient's prostate volume has been measured a size 232 on an MRI on 08/29/2016 with more recent values measured at 156 in May last year.  He is on finasteride and flomax.  His last surveillance cysto was in 11/13/2018 with no evidence of recurrence.   He recently had CX bladder testing.     OBJECTIVE: " PSA from 5/13/20 is 3.68 ng/mL which is 7.36 ng/mL when adjusted for finasteride.  CxBladder is 2.0     ASSESSMENT AND PLAN:  Over half of today's 8-minute visit was spent counseling the patient regarding his prostate and bladder cancer.  We are pleased to see PSA is stable.  We will see the patient back in the fall for his surveillance cysto and another PSA.  We may consider prostate biopsy at that time as it will be 5 years, though the patient is reluctant at this time given the stability of his PSA.  He is in agreement with the plan.    Phone call duration: 8 minutes

## 2020-05-27 NOTE — PATIENT INSTRUCTIONS
Please schedule the patient for a cysto in December.  He will also need a PSA at that time.  Thanks.

## 2020-05-27 NOTE — LETTER
"5/27/2020       RE: Narendra Guadalupe  [address] [facility] [address]     Dear Colleague,    Thank you for referring your patient, Narendra Guadalupe, to the [facility] UROLOGY AND INST FOR PROSTATE AND UROLOGIC CANCERS at [facility]. Please see a copy of my visit note below.    Narendra Guadalupe is a 65 year old male who is being evaluated via a billable telephone visit.      The patient has been notified of following:     \"This telephone visit will be conducted via a call between you and your physician/provider. We have found that certain health care needs can be provided without the need for a physical exam.  This service lets us provide the care you need with a short phone conversation.  If a prescription is necessary we can send it directly to your pharmacy.  If lab work is needed we can place an order for that and you can then stop by our lab to have the test done at a later time.    Telephone visits are billed at different rates depending on your insurance coverage. During this emergency period, for some insurers they may be billed the same as an in-person visit.  Please reach out to your insurance provider with any questions.    If during the course of the call the physician/provider feels a telephone visit is not appropriate, you will not be charged for this service.\"    Patient has given verbal consent for Telephone visit?  Yes    What phone number would you like to be contacted at? [phone]    How would you like to obtain your AVS? Longharsherry    ADDITIONAL PROVIDER NOTES:  REASON FOR CALL TODAY:  Bladder cancer and prostate cancer.      HISTORY OF PRESENT ILLNESS:  Mr. 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  He was also diagnosed with high-grade Ta bladder cancer in 11/2014 for which he had a resection followed by BCG with no evidence " of recurrence to date.  The patient's prostate volume has been measured a size 232 on an MRI on 08/29/2016 with more recent values measured at 156 in May last year.  He is on finasteride and flomax.  His last surveillance cysto was in 11/13/2018 with no evidence of recurrence.   He recently had CX bladder testing.     OBJECTIVE: PSA from 5/13/20 is 3.68 ng/mL which is 7.36 ng/mL when adjusted for finasteride.  CxBladder is 2.0     ASSESSMENT AND PLAN:  Over half of today's 8-minute visit was spent counseling the patient regarding his prostate and bladder cancer.  We are pleased to see PSA is stable.  We will see the patient back in the fall for his surveillance cysto and another PSA.  We may consider prostate biopsy at that time as it will be 5 years, though the patient is reluctant at this time given the stability of his PSA.  He is in agreement with the plan.    Phone call duration: 8 minutes          Again, thank you for allowing me to participate in the care of your patient.      Sincerely,    Taz Chase MD

## 2020-06-22 ENCOUNTER — MYC MEDICAL ADVICE (OUTPATIENT)
Dept: UROLOGY | Facility: CLINIC | Age: 65
End: 2020-06-22

## 2020-11-04 ENCOUNTER — MYC REFILL (OUTPATIENT)
Dept: UROLOGY | Facility: CLINIC | Age: 65
End: 2020-11-04

## 2020-11-04 DIAGNOSIS — N40.0 BPH (BENIGN PROSTATIC HYPERPLASIA): ICD-10-CM

## 2020-11-04 DIAGNOSIS — C61 MALIGNANT NEOPLASM OF PROSTATE (H): ICD-10-CM

## 2020-11-04 RX ORDER — TAMSULOSIN HYDROCHLORIDE 0.4 MG/1
0.4 CAPSULE ORAL DAILY
Qty: 90 CAPSULE | Refills: 0 | Status: SHIPPED | OUTPATIENT
Start: 2020-11-04 | End: 2020-11-12

## 2020-11-04 RX ORDER — FINASTERIDE 5 MG/1
5 TABLET, FILM COATED ORAL DAILY
Qty: 90 TABLET | Refills: 3 | Status: SHIPPED | OUTPATIENT
Start: 2020-11-04 | End: 2020-11-12

## 2020-11-10 ENCOUNTER — MYC MEDICAL ADVICE (OUTPATIENT)
Dept: UROLOGY | Facility: CLINIC | Age: 65
End: 2020-11-10

## 2020-11-12 ENCOUNTER — MYC REFILL (OUTPATIENT)
Dept: UROLOGY | Facility: CLINIC | Age: 65
End: 2020-11-12

## 2020-11-12 DIAGNOSIS — N40.0 BPH (BENIGN PROSTATIC HYPERPLASIA): ICD-10-CM

## 2020-11-12 DIAGNOSIS — C61 MALIGNANT NEOPLASM OF PROSTATE (H): ICD-10-CM

## 2020-11-12 RX ORDER — FINASTERIDE 5 MG/1
5 TABLET, FILM COATED ORAL DAILY
Qty: 90 TABLET | Refills: 3 | Status: SHIPPED | OUTPATIENT
Start: 2020-11-12 | End: 2020-11-23

## 2020-11-12 RX ORDER — TAMSULOSIN HYDROCHLORIDE 0.4 MG/1
0.4 CAPSULE ORAL DAILY
Qty: 90 CAPSULE | Refills: 0 | Status: SHIPPED | OUTPATIENT
Start: 2020-11-12 | End: 2021-10-25

## 2020-11-22 ENCOUNTER — HEALTH MAINTENANCE LETTER (OUTPATIENT)
Age: 65
End: 2020-11-22

## 2020-11-23 DIAGNOSIS — N40.0 BPH (BENIGN PROSTATIC HYPERPLASIA): ICD-10-CM

## 2020-11-23 DIAGNOSIS — C61 MALIGNANT NEOPLASM OF PROSTATE (H): ICD-10-CM

## 2020-11-23 RX ORDER — TAMSULOSIN HYDROCHLORIDE 0.4 MG/1
0.4 CAPSULE ORAL DAILY
Qty: 60 CAPSULE | Refills: 0 | Status: SHIPPED | OUTPATIENT
Start: 2020-11-23 | End: 2022-01-16

## 2020-11-23 RX ORDER — FINASTERIDE 5 MG/1
5 TABLET, FILM COATED ORAL DAILY
Qty: 90 TABLET | Refills: 3 | Status: SHIPPED | OUTPATIENT
Start: 2020-11-23 | End: 2022-01-16

## 2020-11-27 ENCOUNTER — PRE VISIT (OUTPATIENT)
Dept: UROLOGY | Facility: CLINIC | Age: 65
End: 2020-11-27

## 2020-11-27 NOTE — TELEPHONE ENCOUNTER
Visit Type : Bladder cancer and PSA check     Records/Orders: cytology and PSA     Pt Contacted: no    At Rooming: urine

## 2020-12-07 DIAGNOSIS — C61 MALIGNANT NEOPLASM OF PROSTATE (H): ICD-10-CM

## 2020-12-10 DIAGNOSIS — C61 MALIGNANT NEOPLASM OF PROSTATE (H): ICD-10-CM

## 2020-12-10 LAB — PSA SERPL-MCNC: 4.1 UG/L (ref 0–4)

## 2020-12-10 PROCEDURE — 84153 ASSAY OF PSA TOTAL: CPT | Performed by: PATHOLOGY

## 2020-12-10 PROCEDURE — 36415 COLL VENOUS BLD VENIPUNCTURE: CPT | Performed by: PATHOLOGY

## 2020-12-10 RX ORDER — TAMSULOSIN HYDROCHLORIDE 0.4 MG/1
CAPSULE ORAL
Qty: 90 CAPSULE | OUTPATIENT
Start: 2020-12-10

## 2020-12-14 ENCOUNTER — OFFICE VISIT (OUTPATIENT)
Dept: UROLOGY | Facility: CLINIC | Age: 65
End: 2020-12-14
Payer: MEDICARE

## 2020-12-14 VITALS
BODY MASS INDEX: 23.38 KG/M2 | HEART RATE: 54 BPM | HEIGHT: 77 IN | WEIGHT: 198 LBS | SYSTOLIC BLOOD PRESSURE: 137 MMHG | DIASTOLIC BLOOD PRESSURE: 83 MMHG

## 2020-12-14 DIAGNOSIS — C61 PROSTATE CANCER (H): Primary | ICD-10-CM

## 2020-12-14 DIAGNOSIS — Z85.51 PERSONAL HISTORY OF MALIGNANT NEOPLASM OF BLADDER: ICD-10-CM

## 2020-12-14 PROCEDURE — 88112 CYTOPATH CELL ENHANCE TECH: CPT | Mod: GC | Performed by: PATHOLOGY

## 2020-12-14 PROCEDURE — 52000 CYSTOURETHROSCOPY: CPT | Performed by: UROLOGY

## 2020-12-14 RX ORDER — LIDOCAINE HYDROCHLORIDE 20 MG/ML
JELLY TOPICAL ONCE
Status: COMPLETED | OUTPATIENT
Start: 2020-12-14 | End: 2020-12-14

## 2020-12-14 RX ADMIN — LIDOCAINE HYDROCHLORIDE: 20 JELLY TOPICAL at 09:27

## 2020-12-14 ASSESSMENT — MIFFLIN-ST. JEOR: SCORE: 1795.5

## 2020-12-14 ASSESSMENT — PAIN SCALES - GENERAL: PAINLEVEL: NO PAIN (0)

## 2020-12-14 NOTE — NURSING NOTE
"Chief Complaint   Patient presents with     Cystoscopy     bladder cancer       Blood pressure 137/83, pulse 54, height 1.956 m (6' 5\"), weight 89.8 kg (198 lb). Body mass index is 23.48 kg/m .    Patient Active Problem List   Diagnosis     Malignant neoplasm of prostate (H)     Bladder cancer (H)       No Known Allergies    Current Outpatient Medications   Medication Sig Dispense Refill     diazepam (VALIUM) 10 MG tablet Take 1 tablet (10 mg) by mouth once as needed for anxiety 1 tablet 0     finasteride (PROSCAR) 5 MG tablet Take 1 tablet (5 mg) by mouth daily 90 tablet 3     tamsulosin (FLOMAX) 0.4 MG capsule Take 1 capsule (0.4 mg) by mouth daily 90 capsule 0     diazepam (VALIUM) 10 MG tablet Take 1 tablet (10 mg) by mouth every 6 hours as needed for anxiety or sleep Take 30-60 minutes before procedure.  Do not operate a vehicle after taking this medication. 2 tablet 0     diazepam (VALIUM) 10 MG tablet Take 30-60 minutes before procedure.  Do not operate a vehicle after taking this medication. 1 tablet 0     sertraline (ZOLOFT) 50 MG tablet Take 50 mg by mouth daily. Half tab       tamsulosin (FLOMAX) 0.4 MG capsule Take 1 capsule (0.4 mg) by mouth daily (Patient not taking: Reported on 2020) 60 capsule 0       Social History     Tobacco Use     Smoking status: Never Smoker     Smokeless tobacco: Never Used   Substance Use Topics     Alcohol use: Yes     Drug use: No       Invasive Procedure Safety Checklist:    Procedure: Cystoscopy    Action: Complete sections and checkboxes as appropriate.    Pre-procedure:  1. Patient ID Verified with 2 identifiers (Minal and  or MRN) : YES    2. Procedure and site verified with patient/designee (when able) : YES    3. Accurate consent documentation in medical record : YES    4. H&P (or appropriate assessment) documented in medical record : N/A  H&P must be up to 30 days prior to procedure an updated within 24 hours of                 Procedure as applicable. "     5. Relevant diagnostic and radiology test results appropriately labeled and displayed as applicable : YES    6. Blood products, implants, devices, and/or special equipment available for the procedure as applicable : YES    7. Procedure site(s) marked with provider initials [Exclusions: none] : NO    8. Marking not required. Reason : Yes  Procedure does not require site marking    Time Out:     Time-Out performed immediately prior to starting procedure, including verbal and active participation of all team members addressing: YES    1. Correct patient identity.  2. Confirmed that the correct side and site are marked.  3. An accurate procedure to be done.  4. Agreement on the procedure to be done.  5. Correct patient position.  6. Relevant images and results are properly labeled and appropriately displayed.  7. The need to administer antibiotics or fluids for irrigation purposes during the procedure as applicable.  8. Safety precautions based on patient history or medication use.    During Procedure: Verification of correct person, site, and procedure occurs any time the responsibility for care of the patient is transferred to another member of the care team.    The following medication was given:     MEDICATION:  Lidocaine without epinephrine 2% jelly  ROUTE: urethral   SITE: urethral   DOSE: 10 mL  LOT #: [id number]  : [employer]  EXPIRATION DATE: 8-22  NDC#: [address]-9   Was there drug waste? No    Prior to med admin, verified patient identity using patient's name and date of birth.  Due to med administration, patient instructed to remain in clinic for 15 minutes  afterwards, and to report any adverse reaction to me immediately.    Drug Amount Wasted:  None.  Vial/Syringe: CHUCK Figueroa EMT  12/14/2020  8:52 AM

## 2020-12-14 NOTE — LETTER
12/14/2020       RE: Narendra Guadalupe  [address]  [facility] [address]     Dear Colleague,    Thank you for referring your patient, Narendra Guadalupe, to the [facility] UROLOGY CLINIC [place] at [facility]. Please see a copy of my visit note below.    ADDITIONAL PROVIDER NOTES:  REASON FOR CALL TODAY:  Bladder cancer and prostate cancer.      HISTORY OF PRESENT ILLNESS:  Mr. Guadalupe is a 65-year-old gentleman followed in our clinic for history of Patricio 3+3 prostate cancer diagnosed in 2011 for which he is on active surveillance.  He was also diagnosed with high-grade Ta bladder cancer in 11/2014 for which he had a resection followed by BCG with no evidence of recurrence to date.  The patient's prostate volume has been measured a size 232 on an MRI on 08/29/2016 with more recent values measured at 156 in May 2018.  He is on finasteride and flomax.  His last surveillance cysto was in 11/13/2018 with no evidence of recurrence.       OBJECTIVE: PSA from 12/10/20 is 4.10 ng/mL which is 8.2 ng/mL when adjusted for finasteride.    Cystoscopy: after informed consent was obtained, the patient was prepped and draped in standard sterile fashion. The flexible cystoscope was introduced into the patient's urethra without difficulty. There were no strictures in the urethra. Upon entering the bladder, the UOs were orthotopic and effluxing clear urine. There were no mucosal lesions, stones or foreign objects noted in the bladder.  The patient has a very large median lobe and several cellules.  The remainder of the exam was normal.       ASSESSMENT AND PLAN:   We are pleased to see PSA is stable.  We will see the patient back in the spring for another PSA and prostate MRI.  We will discuss if a biopsy is needed at that time.   He is in agreement with the plan.    Sincerely,    Taz Chase MD

## 2020-12-14 NOTE — PATIENT INSTRUCTIONS
Please follow up in the spring with a MRI and PSA before     It was a pleasure meeting with you today.  Thank you for allowing me and my team the privilege of caring for you today.  YOU are the reason we are here, and I truly hope we provided you with the excellent service you deserve.  Please let us know if there is anything else we can do for you so that we can be sure you are leaving completely satisfied with your care experience.

## 2020-12-14 NOTE — PROGRESS NOTES
ADDITIONAL PROVIDER NOTES:  REASON FOR CALL TODAY:  Bladder cancer and prostate cancer.      HISTORY OF PRESENT ILLNESS:  Mr. Guadalupe is a 65-year-old gentleman followed in our clinic for history of Patricio 3+3 prostate cancer diagnosed in 2011 for which he is on active surveillance.  He was also diagnosed with high-grade Ta bladder cancer in 11/2014 for which he had a resection followed by BCG with no evidence of recurrence to date.  The patient's prostate volume has been measured a size 232 on an MRI on 08/29/2016 with more recent values measured at 156 in May 2018.  He is on finasteride and flomax.  His last surveillance cysto was in 11/13/2018 with no evidence of recurrence.       OBJECTIVE: PSA from 12/10/20 is 4.10 ng/mL which is 8.2 ng/mL when adjusted for finasteride.    Cystoscopy: after informed consent was obtained, the patient was prepped and draped in standard sterile fashion. The flexible cystoscope was introduced into the patient's urethra without difficulty. There were no strictures in the urethra. Upon entering the bladder, the UOs were orthotopic and effluxing clear urine. There were no mucosal lesions, stones or foreign objects noted in the bladder.  The patient has a very large median lobe and several cellules.  The remainder of the exam was normal.       ASSESSMENT AND PLAN:   We are pleased to see PSA is stable.  We will see the patient back in the spring for another PSA and prostate MRI.  We will discuss if a biopsy is needed at that time.   He is in agreement with the plan.

## 2020-12-15 LAB — COPATH REPORT: NORMAL

## 2021-02-08 DIAGNOSIS — F41.9 ANXIETY: Primary | ICD-10-CM

## 2021-02-08 RX ORDER — DIAZEPAM 10 MG
TABLET ORAL
Qty: 2 TABLET | Refills: 0 | Status: SHIPPED | OUTPATIENT
Start: 2021-02-08 | End: 2021-10-25

## 2021-03-01 ENCOUNTER — ANCILLARY PROCEDURE (OUTPATIENT)
Dept: MRI IMAGING | Facility: CLINIC | Age: 66
End: 2021-03-01
Attending: UROLOGY
Payer: MEDICARE

## 2021-03-01 DIAGNOSIS — C61 PROSTATE CANCER (H): ICD-10-CM

## 2021-03-01 RX ORDER — GADOBUTROL 604.72 MG/ML
9.3 INJECTION INTRAVENOUS ONCE
Status: COMPLETED | OUTPATIENT
Start: 2021-03-01 | End: 2021-03-01

## 2021-03-01 RX ADMIN — GADOBUTROL 9.3 ML: 604.72 INJECTION INTRAVENOUS at 09:37

## 2021-03-09 ENCOUNTER — MYC MEDICAL ADVICE (OUTPATIENT)
Dept: UROLOGY | Facility: CLINIC | Age: 66
End: 2021-03-09

## 2021-04-14 ENCOUNTER — PRE VISIT (OUTPATIENT)
Dept: UROLOGY | Facility: CLINIC | Age: 66
End: 2021-04-14

## 2021-04-14 NOTE — TELEPHONE ENCOUNTER
Visit Type : PSA and MRI follow up     Records/Orders: MRI is in epic, PSA is on 4/20    Pt Contacted: no    At Rooming: phone

## 2021-04-20 DIAGNOSIS — C61 PROSTATE CANCER (H): ICD-10-CM

## 2021-04-20 LAB — PSA SERPL-MCNC: 5.67 UG/L (ref 0–4)

## 2021-04-20 PROCEDURE — 84153 ASSAY OF PSA TOTAL: CPT | Performed by: PATHOLOGY

## 2021-04-20 PROCEDURE — 36415 COLL VENOUS BLD VENIPUNCTURE: CPT | Performed by: PATHOLOGY

## 2021-04-26 ENCOUNTER — VIRTUAL VISIT (OUTPATIENT)
Dept: UROLOGY | Facility: CLINIC | Age: 66
End: 2021-04-26
Payer: MEDICARE

## 2021-04-26 DIAGNOSIS — C61 PROSTATE CANCER (H): Primary | ICD-10-CM

## 2021-04-26 PROCEDURE — 99441 PR PHYSICIAN TELEPHONE EVALUATION 5-10 MIN: CPT | Mod: 95 | Performed by: UROLOGY

## 2021-04-26 NOTE — NURSING NOTE
Chief Complaint   Patient presents with     Follow Up     imaging        Patient Active Problem List   Diagnosis     Malignant neoplasm of prostate (H)     Bladder cancer (H)       No Known Allergies    Current Outpatient Medications   Medication Sig Dispense Refill     finasteride (PROSCAR) 5 MG tablet Take 1 tablet (5 mg) by mouth daily 90 tablet 3     tamsulosin (FLOMAX) 0.4 MG capsule Take 1 capsule (0.4 mg) by mouth daily 90 capsule 0     diazepam (VALIUM) 10 MG tablet Take one tablet 30 min prior to procedure 2 tablet 0     diazepam (VALIUM) 10 MG tablet Take 1 tablet (10 mg) by mouth once as needed for anxiety 1 tablet 0     diazepam (VALIUM) 10 MG tablet Take 1 tablet (10 mg) by mouth every 6 hours as needed for anxiety or sleep Take 30-60 minutes before procedure.  Do not operate a vehicle after taking this medication. 2 tablet 0     diazepam (VALIUM) 10 MG tablet Take 30-60 minutes before procedure.  Do not operate a vehicle after taking this medication. 1 tablet 0     sertraline (ZOLOFT) 50 MG tablet Take 50 mg by mouth daily. Half tab       tamsulosin (FLOMAX) 0.4 MG capsule Take 1 capsule (0.4 mg) by mouth daily (Patient not taking: Reported on 12/14/2020) 60 capsule 0       Social History     Tobacco Use     Smoking status: Never Smoker     Smokeless tobacco: Never Used   Substance Use Topics     Alcohol use: Yes     Drug use: No       Marci Albarado LPN  4/26/2021  12:58 PM

## 2021-04-26 NOTE — LETTER
4/26/2021       RE: Narendra Guadalupe  [address] [facility] [address]     Dear Colleague,    Thank you for referring your patient, Narendra Guadalupe, to the [facility] UROLOGY CLINIC [place] at [facility]. Please see a copy of my visit note below.    Zohaib is a 66 year old who is being evaluated via a billable telephone visit.      What phone number would you like to be contacted at? [phone]  How would you like to obtain your AVS? Longharsherry       REASON FOR CALL TODAY:  Bladder cancer and prostate cancer.      HISTORY OF PRESENT ILLNESS:  Mr. Guadalupe is a 66-year-old gentleman followed in our clinic for history of Patricio 3+3 prostate cancer diagnosed in 2011 for which he is on active surveillance.  He was also diagnosed with high-grade Ta bladder cancer in 11/2014 for which he had a resection followed by BCG with no evidence of recurrence to date.  The patient's prostate volume has been measured a size 232 on an MRI on 08/29/2016 with more recent values measured at 156 in May 2018.  He is on finasteride and flomax.  His last surveillance cysto was in 11/13/2018 with no evidence of recurrence.       OBJECTIVE: PSA from 4/20/21 is 5.67 ng/mL which is 11.2 ng/mL when adjusted for finasteride.    MRI from 3/1/21 shows prostate volume of 190 ml and only PIRADS 2 lesions           ASSESSMENT AND PLAN:    Over half of today's 15-minute visit was spent reviewing the chart, results and counseling the patient regarding his prostate cancer and bladder cancer.   We will plan on performing a PSA and  surveillance biopsy in the fall when the patient returns from [place].  We will also perform his surveillance cysto the same day ideally.  The patient is in agreement with the plan.         Phone call duration: 8 minutes    Again, thank you for allowing me to participate in the care of your patient.      Sincerely,    Taz Chase MD

## 2021-04-26 NOTE — PATIENT INSTRUCTIONS
PSA, and cystoscopy and prostate biopsy (TRUS) in the fall.        It was a pleasure meeting with you today.  Thank you for allowing me and my team the privilege of caring for you today.  YOU are the reason we are here, and I truly hope we provided you with the excellent service you deserve.  Please let us know if there is anything else we can do for you so that we can be sure you are leaving completely satisfied with your care experience.

## 2021-04-26 NOTE — PROGRESS NOTES
Zohaib is a 66 year old who is being evaluated via a billable telephone visit.      What phone number would you like to be contacted at? [phone]  How would you like to obtain your AVS? Lizzeth       REASON FOR CALL TODAY:  Bladder cancer and prostate cancer.      HISTORY OF PRESENT ILLNESS:  Mr. Guadalupe is a 66-year-old gentleman followed in our clinic for history of Patricio 3+3 prostate cancer diagnosed in 2011 for which he is on active surveillance.  He was also diagnosed with high-grade Ta bladder cancer in 11/2014 for which he had a resection followed by BCG with no evidence of recurrence to date.  The patient's prostate volume has been measured a size 232 on an MRI on 08/29/2016 with more recent values measured at 156 in May 2018.  He is on finasteride and flomax.  His last surveillance cysto was in 11/13/2018 with no evidence of recurrence.       OBJECTIVE: PSA from 4/20/21 is 5.67 ng/mL which is 11.2 ng/mL when adjusted for finasteride.    MRI from 3/1/21 shows prostate volume of 190 ml and only PIRADS 2 lesions           ASSESSMENT AND PLAN:    Over half of today's 15-minute visit was spent reviewing the chart, results and counseling the patient regarding his prostate cancer and bladder cancer.   We will plan on performing a PSA and  surveillance biopsy in the fall when the patient returns from [place].  We will also perform his surveillance cysto the same day ideally.  The patient is in agreement with the plan.         Phone call duration: 8 minutes

## 2021-04-27 ENCOUNTER — TELEPHONE (OUTPATIENT)
Dept: UROLOGY | Facility: CLINIC | Age: 66
End: 2021-04-27

## 2021-09-19 ENCOUNTER — HEALTH MAINTENANCE LETTER (OUTPATIENT)
Age: 66
End: 2021-09-19

## 2021-10-11 ENCOUNTER — PRE VISIT (OUTPATIENT)
Dept: UROLOGY | Facility: CLINIC | Age: 66
End: 2021-10-11

## 2021-10-11 DIAGNOSIS — C61 PROSTATE CANCER (H): Primary | ICD-10-CM

## 2021-10-11 RX ORDER — CIPROFLOXACIN 500 MG/1
500 TABLET, FILM COATED ORAL 2 TIMES DAILY
Qty: 6 TABLET | Refills: 0 | Status: SHIPPED | OUTPATIENT
Start: 2021-10-11 | End: 2021-10-14

## 2021-10-11 NOTE — TELEPHONE ENCOUNTER
Reason for visit: prostate biopsy      Dx/Hx/Sx: prostate cancer     Records/imaging/labs/orders: PSA scheduled morning of appointment, MRI in [facility]     At Rooming: regular biopsy (PIRADS-2)    Called patient to go over prep for biopsy including:      No blood thinning medications for 7 days before procedure, including aspirin, anticoagulants, ibuprofen and fish oil.       prophylactic antibiotics sent to primary pharmacy listed in chart:   -take the day before, the day of and the day after the procedure, one tablet, two times daily.      Complete a Fleets enema approximately 2 hours before the scheduled procedure.      Do not come to the appointment fasted.

## 2021-10-18 ENCOUNTER — LAB (OUTPATIENT)
Dept: LAB | Facility: CLINIC | Age: 66
End: 2021-10-18
Attending: UROLOGY

## 2021-10-18 ENCOUNTER — PRE VISIT (OUTPATIENT)
Dept: UROLOGY | Facility: CLINIC | Age: 66
End: 2021-10-18

## 2021-10-18 ENCOUNTER — OFFICE VISIT (OUTPATIENT)
Dept: UROLOGY | Facility: CLINIC | Age: 66
End: 2021-10-18
Payer: MEDICARE

## 2021-10-18 VITALS
DIASTOLIC BLOOD PRESSURE: 83 MMHG | WEIGHT: 202.9 LBS | HEIGHT: 76 IN | HEART RATE: 50 BPM | SYSTOLIC BLOOD PRESSURE: 122 MMHG | BODY MASS INDEX: 24.71 KG/M2

## 2021-10-18 DIAGNOSIS — C61 PROSTATE CANCER (H): ICD-10-CM

## 2021-10-18 DIAGNOSIS — C61 PROSTATE CANCER (H): Primary | ICD-10-CM

## 2021-10-18 LAB — PSA SERPL-MCNC: 4 UG/L (ref 0–4)

## 2021-10-18 PROCEDURE — 36415 COLL VENOUS BLD VENIPUNCTURE: CPT | Performed by: PATHOLOGY

## 2021-10-18 PROCEDURE — 84153 ASSAY OF PSA TOTAL: CPT | Performed by: PATHOLOGY

## 2021-10-18 PROCEDURE — 55700 PR BIOPSY OF PROSTATE,NEEDLE/PUNCH: CPT | Performed by: UROLOGY

## 2021-10-18 PROCEDURE — 76872 US TRANSRECTAL: CPT | Performed by: UROLOGY

## 2021-10-18 PROCEDURE — G0416 PROSTATE BIOPSY, ANY MTHD: HCPCS | Mod: 26 | Performed by: PATHOLOGY

## 2021-10-18 RX ORDER — GENTAMICIN 40 MG/ML
80 INJECTION, SOLUTION INTRAMUSCULAR; INTRAVENOUS ONCE
Status: COMPLETED | OUTPATIENT
Start: 2021-10-18 | End: 2021-10-18

## 2021-10-18 RX ADMIN — GENTAMICIN 80 MG: 40 INJECTION, SOLUTION INTRAMUSCULAR; INTRAVENOUS at 10:54

## 2021-10-18 ASSESSMENT — PAIN SCALES - GENERAL: PAINLEVEL: NO PAIN (0)

## 2021-10-18 ASSESSMENT — MIFFLIN-ST. JEOR: SCORE: 1796.85

## 2021-10-18 NOTE — PATIENT INSTRUCTIONS
[facility] for Prostate and Urologic Cancers  Precautions Following a Prostate Biopsy    There are four conditions that you should watch for after a prostate biopsy:    1. Excessive pain  2. Bleeding irregularities (passing numerous  dime sized  clots or if your urine looks like cranberry juice)  3. Fever of 100 degrees or more  4. If you are unable to urinate        If any of these occur, call the Urology Clinic during normal business hours (M-F, 8:00-4:30) at [phone].  If you experience a problem after normal business hours, call our 24-hour phone number at [phone] and ask for the Urology Resident on call to be paged.      If you experience any discomfort following the biopsy, you may take Tylenol.  DO NOT TAKE ASPIRIN unless specified by your physician.   If the discomfort becomes severe or uncontrolled by medication, contact the Urology Clinic or Urology Resident (after normal business hours).      Do not be alarmed if you have some blood in your stool, in your urine, or ejaculate (semen).  This occurrence is normal and may last up to three (3) or four (4) days, usually intermittently.  Blood in the ejaculate (semen) may last several weeks, up to about a dozen ejaculations.  The blood in your ejaculate may appear as brown streaks, blood tinged, and immediately following a biopsy, it may appear bright red.      If you run a fever above 100 degrees, call the Urology Clinic or Urology Resident (after normal business hours) immediately.  If you are unable to reach your physician or the Resident on call, go to the nearest emergency room.  Explain that you have had a transrectal biopsy of your prostate and what problems you are experiencing.        You should attempt to urinate following your biopsy before you leave the clinic.  If you are unable to urinate four (4) to six (6) hours after you leave the clinic, you will need to contact the Urology Clinic or the Resident on call.  If you are unable to reach  your physician or the Resident on call, go to the nearest emergency room.            If you have any questions or concerns after your biopsy, feel free to contact the Urology Clinic at [phone] during M-F, 8:00-5pm business hours.  If you need to speak with someone after normal business hours, call [phone] and ask for the Resident on call to be paged.

## 2021-10-18 NOTE — NURSING NOTE
"Chief Complaint   Patient presents with     Prostate Biopsy     prostate cancer        Blood pressure 122/83, pulse 50, height 1.93 m (6' 4\"), weight 92 kg (202 lb 14.4 oz). Body mass index is 24.7 kg/m .    Patient Active Problem List   Diagnosis     Malignant neoplasm of prostate (H)     Bladder cancer (H)       No Known Allergies    Current Outpatient Medications   Medication Sig Dispense Refill     finasteride (PROSCAR) 5 MG tablet Take 1 tablet (5 mg) by mouth daily 90 tablet 3     tamsulosin (FLOMAX) 0.4 MG capsule Take 1 capsule (0.4 mg) by mouth daily 90 capsule 0     diazepam (VALIUM) 10 MG tablet Take one tablet 30 min prior to procedure 2 tablet 0     diazepam (VALIUM) 10 MG tablet Take 1 tablet (10 mg) by mouth once as needed for anxiety 1 tablet 0     diazepam (VALIUM) 10 MG tablet Take 1 tablet (10 mg) by mouth every 6 hours as needed for anxiety or sleep Take 30-60 minutes before procedure.  Do not operate a vehicle after taking this medication. 2 tablet 0     diazepam (VALIUM) 10 MG tablet Take 30-60 minutes before procedure.  Do not operate a vehicle after taking this medication. 1 tablet 0     sertraline (ZOLOFT) 50 MG tablet Take 50 mg by mouth daily. Half tab       tamsulosin (FLOMAX) 0.4 MG capsule Take 1 capsule (0.4 mg) by mouth daily (Patient not taking: Reported on 2020) 60 capsule 0       Social History     Tobacco Use     Smoking status: Never Smoker     Smokeless tobacco: Never Used   Substance Use Topics     Alcohol use: Yes     Drug use: No       Invasive Procedure Safety Checklist:    Procedure: MRI fusion TRUS prostate biopsy    Action: Complete sections and checkboxes as appropriate.    Pre-procedure:  1. Patient ID Verified with 2 identifiers (Minal and  or MRN) : YES    2. Procedure and site verified with patient/designee (when able) : YES    3. Accurate consent documentation in medical record : YES    4. H&P (or appropriate assessment) documented in medical record : " N/A  H&P must be up to 30 days prior to procedure an updated within 24 hours of                 Procedure as applicable.     5. Relevant diagnostic and radiology test results appropriately labeled and displayed as applicable : YES    6. Blood products, implants, devices, and/or special equipment available for the procedure as applicable : YES    7. Procedure site(s) marked with provider initials [Exclusions: none] : NO    8. Marking not required. Reason : Yes  Procedure does not require site marking    Time Out:     Time-Out performed immediately prior to starting procedure, including verbal and active participation of all team members addressing: YES    1. Correct patient identity.  2. Confirmed that the correct side and site are marked.  3. An accurate procedure to be done.  4. Agreement on the procedure to be done.  5. Correct patient position.  6. Relevant images and results are properly labeled and appropriately displayed.  7. The need to administer antibiotics or fluids for irrigation purposes during the procedure as applicable.  8. Safety precautions based on patient history or medication use.    During Procedure: Verification of correct person, site, and procedure occurs any time the responsibility for care of the patient is transferred to another member of the care team.      The following medication was given:     MEDICATION:  Lidocaine without epinephrine 1%  ROUTE: administered by physician - prostate nerve block   SITE: administered by physician - prostate nerve block    DOSE: 10 mL  LOT #: [id number]  : [employer]  EXPIRATION DATE: 03/25  NDC#: 70673-610-29   Was there drug waste? Yes  Amount of drug waste (mL): 20 mL.  Reason for waste:  Single use vial    Prior to injection, verified patient identity using patient's name and date of birth.  Due to injection administration, patient instructed to remain in clinic for 15 minutes  afterwards, and to report any adverse reaction to me  immediately.    Drug Amount Wasted:  Yes: 20 mL (10 mg/ml )  Vial/Syringe: Single dose vial    Gela Moses  10/18/2021  10:51 AM

## 2021-10-18 NOTE — TELEPHONE ENCOUNTER
Reason for visit: biopsy results      Dx/Hx/Sx: prostate cancer     Records/imaging/labs/orders: biopsy results in epic    At Rooming: video visit

## 2021-10-18 NOTE — LETTER
10/18/2021       RE: Narendra Guadalupe  [address] [facility] [address]     Dear Colleague,    Thank you for referring your patient, Narendra Guadalupe, to the [facility] UROLOGY CLINIC [place] at [facility]. Please see a copy of my visit note below.    Reason for visit: MRI ultrasound fusion prostate biopsy    Indications: Mr. Guadalupe is a 66-year-old gentleman followed in clinic for prostate cancer. He presents today for  MRI/ultrasound fusion prostate biopsy.    Procedure: After informed consent was obtained and after confirming the patient has been off aspirin or aspirin like products for a week, took his antibiotics and perform his enema, the patient was placed left side down on the procedure table. Digital rectal exam was performed revealing a normal feeling prostate. The ultrasound probe was lubricated and placed into the patient's rectum without difficulty. Inspection of the prostate revealed a few calcifications, but no obvious hypoechoic regions.   Next 5 ccs of lidocaine were injected at the junction of the prostate and the seminal vesicles bilaterally.  Measurement of the prostate were then obtained revealing a volume of 180 ccs.  We then obtained 12 cores in the standard sextant distribution with an additional core each from the left and right transition zones for a total of 14 cores obtained. The patient tolerated the procedure without difficulty.    The patient was counseled he could expect to see blood in his urine, semen, and stool for the next several days and should contact us should he develop any fevers chills or sweats. We'll see the patient back in a week to go over the results of his biopsy.     Again, thank you for allowing me to participate in the care of your patient.      Sincerely,    Taz Chase MD

## 2021-10-18 NOTE — PROGRESS NOTES
Reason for visit: MRI ultrasound fusion prostate biopsy    Indications: Mr. Guadalupe is a 66-year-old gentleman followed in clinic for prostate cancer. He presents today for  MRI/ultrasound fusion prostate biopsy.    Procedure: After informed consent was obtained and after confirming the patient has been off aspirin or aspirin like products for a week, took his antibiotics and perform his enema, the patient was placed left side down on the procedure table. Digital rectal exam was performed revealing a normal feeling prostate. The ultrasound probe was lubricated and placed into the patient's rectum without difficulty. Inspection of the prostate revealed a few calcifications, but no obvious hypoechoic regions.   Next 5 ccs of lidocaine were injected at the junction of the prostate and the seminal vesicles bilaterally.  Measurement of the prostate were then obtained revealing a volume of 180 ccs.  We then obtained 12 cores in the standard sextant distribution with an additional core each from the left and right transition zones for a total of 14 cores obtained. The patient tolerated the procedure without difficulty.    The patient was counseled he could expect to see blood in his urine, semen, and stool for the next several days and should contact us should he develop any fevers chills or sweats. We'll see the patient back in a week to go over the results of his biopsy.

## 2021-10-19 LAB
PATH REPORT.COMMENTS IMP SPEC: NORMAL
PATH REPORT.COMMENTS IMP SPEC: NORMAL
PATH REPORT.FINAL DX SPEC: NORMAL
PATH REPORT.GROSS SPEC: NORMAL
PATH REPORT.MICROSCOPIC SPEC OTHER STN: NORMAL
PHOTO IMAGE: NORMAL

## 2021-10-25 ENCOUNTER — VIRTUAL VISIT (OUTPATIENT)
Dept: UROLOGY | Facility: CLINIC | Age: 66
End: 2021-10-25
Payer: MEDICARE

## 2021-10-25 DIAGNOSIS — C61 PROSTATE CANCER (H): Primary | ICD-10-CM

## 2021-10-25 PROBLEM — G47.61 PLMD (PERIODIC LIMB MOVEMENT DISORDER): Status: ACTIVE | Noted: 2021-10-25

## 2021-10-25 PROCEDURE — 99213 OFFICE O/P EST LOW 20 MIN: CPT | Mod: 95 | Performed by: UROLOGY

## 2021-10-25 NOTE — PROGRESS NOTES
Zohaib is a 66 year old who is being evaluated via a billable video visit.      How would you like to obtain your AVS? MyChart  If the video visit is dropped, the invitation should be resent by:   Will anyone else be joining your video visit? No      Video Start Time: 4:28pm  Video-Visit Details  HISTORY OF PRESENT ILLNESS:  Mr. Guadalupe is a 66-year-old gentleman followed in our clinic for history of [place] 3+3 prostate cancer diagnosed in 2011 for which he is on active surveillance.  He was also diagnosed with high-grade Ta bladder cancer in 11/2014 for which he had a resection followed by BCG with no evidence of recurrence to date.  The patient's prostate volume has been measured to be 232 cc on an MRI on 08/29/2016 with more recent values measured at 156 in May 2018.  He is on finasteride and flomax.  he recently had a surveillance cysto    OBJECTIVE:  Pathology from 10/18/21: no evidence of cancer    ASSESSMENT AND PLAN:   Over half of today's 25-minute visit was spent reviewing the chart, results and counseling the patient regarding his biopsy.  We are pleased to see no evidence of cancer.  Will recheck a PSA in 6 months.   Patient will have his surveillance cysto in February.     Type of service:  Video Visit    Video End Time:4:36pm    Originating Location (pt. Location): Home    Distant Location (provider location):  [facility] UROLOGY CLINIC [facility]     Platform used for Video Visit: [email or web address]

## 2021-10-25 NOTE — LETTER
10/25/2021       RE: Narendra Guadalupe  [address] [facility] [address]     Dear Colleague,    Thank you for referring your patient, Narendra Guadalupe, to the [facility] UROLOGY CLINIC [place] at [facility]. Please see a copy of my visit note below.    Zohaib is a 66 year old who is being evaluated via a billable video visit.      How would you like to obtain your AVS? MyChart  If the video visit is dropped, the invitation should be resent by:   Will anyone else be joining your video visit? No    Video Start Time: 4:28pm  Video-Visit Details  HISTORY OF PRESENT ILLNESS:  Mr. Guadalupe is a 66-year-old gentleman followed in our clinic for history of [place] 3+3 prostate cancer diagnosed in 2011 for which he is on active surveillance.  He was also diagnosed with high-grade Ta bladder cancer in 11/2014 for which he had a resection followed by BCG with no evidence of recurrence to date.  The patient's prostate volume has been measured to be 232 cc on an MRI on 08/29/2016 with more recent values measured at 156 in May 2018.  He is on finasteride and flomax.  he recently had a surveillance cysto    OBJECTIVE:  Pathology from 10/18/21: no evidence of cancer    ASSESSMENT AND PLAN:   Over half of today's 25-minute visit was spent reviewing the chart, results and counseling the patient regarding his biopsy.  We are pleased to see no evidence of cancer.  Will recheck a PSA in 6 months.   Patient will have his surveillance cysto in February.     Type of service:  Video Visit    Video End Time:4:36pm    Originating Location (pt. Location): Home    Distant Location (provider location):  [facility] UROLOGY [facility]     Platform used for Video Visit: [employer]ort[employer]    Again, thank you for allowing me to participate in the care of your patient.      Sincerely,    Taz Chase MD

## 2021-10-26 NOTE — PATIENT INSTRUCTIONS
Keep appointment with Dr. Chase in February for cystoscopy.  Please schedule appointment for PSA prior to this.    It was a pleasure meeting with you today.  Thank you for allowing me and my team the privilege of caring for you today.  YOU are the reason we are here, and I truly hope we provided you with the excellent service you deserve.  Please let us know if there is anything else we can do for you so that we can be sure you are leaving completely satisfied with your care experience.        Sydnee Barlow, CMA

## 2022-01-08 ENCOUNTER — HEALTH MAINTENANCE LETTER (OUTPATIENT)
Age: 67
End: 2022-01-08

## 2022-01-12 DIAGNOSIS — C61 MALIGNANT NEOPLASM OF PROSTATE (H): ICD-10-CM

## 2022-01-12 DIAGNOSIS — N40.0 BPH (BENIGN PROSTATIC HYPERPLASIA): ICD-10-CM

## 2022-01-16 RX ORDER — FINASTERIDE 5 MG/1
1 TABLET, FILM COATED ORAL DAILY
Qty: 90 TABLET | Refills: 2 | Status: SHIPPED | OUTPATIENT
Start: 2022-01-16 | End: 2023-03-04

## 2022-01-16 RX ORDER — TAMSULOSIN HYDROCHLORIDE 0.4 MG/1
0.4 CAPSULE ORAL DAILY
Qty: 90 CAPSULE | Refills: 2 | Status: SHIPPED | OUTPATIENT
Start: 2022-01-16 | End: 2023-05-26

## 2022-02-21 ENCOUNTER — PRE VISIT (OUTPATIENT)
Dept: UROLOGY | Facility: CLINIC | Age: 67
End: 2022-02-21
Payer: MEDICARE

## 2022-02-21 NOTE — TELEPHONE ENCOUNTER
Reason for visit: surveillance cystoscopy      Dx/Hx/Sx: bladder cancer, prostate cancer     Records/imaging/labs/orders: in epic    At Rooming: collect urine

## 2022-02-28 ENCOUNTER — OFFICE VISIT (OUTPATIENT)
Dept: UROLOGY | Facility: CLINIC | Age: 67
End: 2022-02-28
Payer: MEDICARE

## 2022-02-28 VITALS
BODY MASS INDEX: 24.21 KG/M2 | SYSTOLIC BLOOD PRESSURE: 128 MMHG | HEIGHT: 77 IN | HEART RATE: 57 BPM | DIASTOLIC BLOOD PRESSURE: 79 MMHG | WEIGHT: 205 LBS

## 2022-02-28 DIAGNOSIS — C61 MALIGNANT NEOPLASM OF PROSTATE (H): Primary | ICD-10-CM

## 2022-02-28 PROCEDURE — 88305 TISSUE EXAM BY PATHOLOGIST: CPT | Mod: TC | Performed by: UROLOGY

## 2022-02-28 PROCEDURE — 88305 TISSUE EXAM BY PATHOLOGIST: CPT | Mod: 26 | Performed by: PATHOLOGY

## 2022-02-28 PROCEDURE — 88112 CYTOPATH CELL ENHANCE TECH: CPT | Mod: 26 | Performed by: PATHOLOGY

## 2022-02-28 PROCEDURE — 52204 CYSTOSCOPY W/BIOPSY(S): CPT | Performed by: UROLOGY

## 2022-02-28 PROCEDURE — 88112 CYTOPATH CELL ENHANCE TECH: CPT | Mod: TC | Performed by: UROLOGY

## 2022-02-28 RX ORDER — LIDOCAINE HYDROCHLORIDE 20 MG/ML
JELLY TOPICAL ONCE
Status: COMPLETED | OUTPATIENT
Start: 2022-02-28 | End: 2022-02-28

## 2022-02-28 RX ADMIN — LIDOCAINE HYDROCHLORIDE: 20 JELLY TOPICAL at 09:55

## 2022-02-28 ASSESSMENT — PAIN SCALES - GENERAL: PAINLEVEL: NO PAIN (0)

## 2022-02-28 NOTE — PROGRESS NOTES
HISTORY OF PRESENT ILLNESS:  Mr. Guadalupe is a 66-year-old gentleman followed in our clinic for history of [place] 3+3 prostate cancer diagnosed in 2011 for which he is on active surveillance.  He was also diagnosed with high-grade Ta bladder cancer in 11/2014 for which he had a resection followed by BCG with no evidence of recurrence to date.  The patient's prostate volume has been measured to be 232 cc on an MRI on 08/29/2016 with more recent values measured at 156 in May 2018, and 190 ml on an MRI from 3/1/21.   He is on finasteride and flomax.  He recently had a surveillance biopsy on 10/18/21 showing no cancer.  He is here for a surveillance cysto.     OBJECTIVE:  PSA from 10/18/21 is 4.0 (8.0 ng/mL adjusted for finasteride)    Cystoscopy: after informed consent was obtained, the patient was prepped and draped in standard sterile fashion. The flexible cystoscope was introduced into the patient's urethra without difficulty. There were no strictures in the urethra. Upon entering the bladder, the UOs were orthotopic and effluxing clear urine. There was an area that had some calcification on it and whitish mucosa under it.  The calcification was scraped off and and a biopsy was taken of this area.  The remainder of the exam was normal.     ASSESSMENT AND PLAN:   Over half of today's 25-minute visit was spent reviewing the chart, results and counseling the patient regarding his biopsy.  We will go over the pathology results in 2 weeks and determine further cysto surveillance from there.  Patient will need PSA later this year.

## 2022-02-28 NOTE — LETTER
2/28/2022       RE: Narendra Guadalupe  [address] [facility] [address]     Dear Colleague,    Thank you for referring your patient, Narendra Guadalupe, to the [facility] UROLOGY CLINIC [place] at [facility]. Please see a copy of my visit note below.    HISTORY OF PRESENT ILLNESS:  Mr. Guadalupe is a 66-year-old gentleman followed in our clinic for history of [place] 3+3 prostate cancer diagnosed in 2011 for which he is on active surveillance.  He was also diagnosed with high-grade Ta bladder cancer in 11/2014 for which he had a resection followed by BCG with no evidence of recurrence to date.  The patient's prostate volume has been measured to be 232 cc on an MRI on 08/29/2016 with more recent values measured at 156 in May 2018, and 190 ml on an MRI from 3/1/21.   He is on finasteride and flomax.  He recently had a surveillance biopsy on 10/18/21 showing no cancer.  He is here for a surveillance cysto.     OBJECTIVE:  PSA from 10/18/21 is 4.0 (8.0 ng/mL adjusted for finasteride)    Cystoscopy: after informed consent was obtained, the patient was prepped and draped in standard sterile fashion. The flexible cystoscope was introduced into the patient's urethra without difficulty. There were no strictures in the urethra. Upon entering the bladder, the UOs were orthotopic and effluxing clear urine. There was an area that had some calcification on it and whitish mucosa under it.  The calcification was scraped off and and a biopsy was taken of this area.  The remainder of the exam was normal.     ASSESSMENT AND PLAN:   Over half of today's 25-minute visit was spent reviewing the chart, results and counseling the patient regarding his biopsy.  We will go over the pathology results in 2 weeks and determine further cysto surveillance from there.  Patient will need PSA later this year.    Sincerely,    Taz Chase MD

## 2022-02-28 NOTE — NURSING NOTE
"Chief Complaint   Patient presents with     Cystoscopy     Bladder Cancer       Blood pressure 128/79, pulse 57, height 1.943 m (6' 4.5\"), weight 93 kg (205 lb). Body mass index is 24.63 kg/m .    Patient Active Problem List   Diagnosis     Malignant neoplasm of prostate (H)     Bladder cancer (H)     Asthma     Fasting hyperglycemia     Obstructive sleep apnea syndrome     PLMD (periodic limb movement disorder)     Rosacea     Vitamin D deficiency     Malignant neoplasm of urinary bladder (H)     Prostate cancer (H)       No Known Allergies    Current Outpatient Medications   Medication Sig Dispense Refill     finasteride (PROSCAR) 5 MG tablet Take 1 tablet (5 mg) by mouth daily 90 tablet 2     tamsulosin (FLOMAX) 0.4 MG capsule Take 1 capsule (0.4 mg) by mouth daily 90 capsule 2       Social History     Tobacco Use     Smoking status: Never Smoker     Smokeless tobacco: Never Used   Substance Use Topics     Alcohol use: Yes     Drug use: No       Invasive Procedure Safety Checklist:    Procedure: Cystoscopy    Action: Complete sections and checkboxes as appropriate.    Pre-procedure:  1. Patient ID Verified with 2 identifiers ([place] and  or MRN) : YES    2. Procedure and site verified with patient/designee (when able) : YES    3. Accurate consent documentation in medical record : YES    4. H&P (or appropriate assessment) documented in medical record : N/A  H&P must be up to 30 days prior to procedure an updated within 24 hours of                 Procedure as applicable.     5. Relevant diagnostic and radiology test results appropriately labeled and displayed as applicable : YES    6. Blood products, implants, devices, and/or special equipment available for the procedure as applicable : YES    7. Procedure site(s) marked with provider initials [Exclusions: none] : NO    8. Marking not required. Reason : Yes  Procedure does not require site marking    Time Out:     Time-Out performed immediately prior to starting " procedure, including verbal and active participation of all team members addressing: YES    1. Correct patient identity.  2. Confirmed that the correct side and site are marked.  3. An accurate procedure to be done.  4. Agreement on the procedure to be done.  5. Correct patient position.  6. Relevant images and results are properly labeled and appropriately displayed.  7. The need to administer antibiotics or fluids for irrigation purposes during the procedure as applicable.  8. Safety precautions based on patient history or medication use.    During Procedure: Verification of correct person, site, and procedure occurs any time the responsibility for care of the patient is transferred to another member of the care team.    The following medication was given:     MEDICATION:  Lidocaine without epinephrine 2% jelly  ROUTE: urethral   SITE: urethral   DOSE: 10 mL  LOT #: [id number]  : [employer]  EXPIRATION DATE: 8-23  NDC#: [address]-5   Was there drug waste? No    Prior to med admin, verified patient identity using patient's name and date of birth.  Due to med administration, patient instructed to remain in clinic for 15 minutes  afterwards, and to report any adverse reaction to me immediately.    Drug Amount Wasted:  None.  Vial/Syringe: Syringe      Hemant Mehtas  2/28/2022  8:32 AM

## 2022-03-01 ENCOUNTER — PRE VISIT (OUTPATIENT)
Dept: UROLOGY | Facility: CLINIC | Age: 67
End: 2022-03-01
Payer: MEDICARE

## 2022-03-01 LAB
PATH REPORT.COMMENTS IMP SPEC: NORMAL
PATH REPORT.FINAL DX SPEC: NORMAL
PATH REPORT.FINAL DX SPEC: NORMAL
PATH REPORT.GROSS SPEC: NORMAL
PATH REPORT.GROSS SPEC: NORMAL
PATH REPORT.MICROSCOPIC SPEC OTHER STN: NORMAL
PATH REPORT.MICROSCOPIC SPEC OTHER STN: NORMAL
PATH REPORT.RELEVANT HX SPEC: NORMAL
PHOTO IMAGE: NORMAL

## 2022-03-01 NOTE — TELEPHONE ENCOUNTER
Reason for visit: pathology review      Dx/Hx/Sx: bladder cancer     Records/imaging/labs/orders: pathology, cytology in epic    At Rooming: video visit

## 2022-03-14 ENCOUNTER — MYC MEDICAL ADVICE (OUTPATIENT)
Dept: UROLOGY | Facility: CLINIC | Age: 67
End: 2022-03-14

## 2022-03-14 ENCOUNTER — VIRTUAL VISIT (OUTPATIENT)
Dept: UROLOGY | Facility: CLINIC | Age: 67
End: 2022-03-14
Payer: MEDICARE

## 2022-03-14 DIAGNOSIS — C61 MALIGNANT NEOPLASM OF PROSTATE (H): Primary | ICD-10-CM

## 2022-03-14 PROCEDURE — 99441 PR PHYSICIAN TELEPHONE EVALUATION 5-10 MIN: CPT | Mod: 95 | Performed by: UROLOGY

## 2022-03-14 NOTE — LETTER
3/14/2022       RE: Narendra Guadalupe  [address] [facility] [address]     Dear Colleague,    Thank you for referring your patient, Narendra Guadalupe, to the [facility] UROLOGY CLINIC [place] at [facility]. Please see a copy of my visit note below.    Zohaib is a 67 year old who is being evaluated via a billable video visit.      How would you like to obtain your AVS? MyChart  If the video visit is dropped, the invitation should be resent by: Send to e-mail at: [email or web address]  Will anyone else be joining your video visit? No    Attempted video converted to phone    Video-Visit Details  HISTORY OF PRESENT ILLNESS:  Mr. Guadalupe is a 67-year-old gentleman followed in our clinic for history of [place] 3+3 prostate cancer diagnosed in 2011 for which he is on active surveillance.  He was also diagnosed with high-grade Ta bladder cancer in 11/2014 for which he had a resection followed by BCG with no evidence of recurrence to date.  The patient's prostate volume has been measured to be 232 cc on an MRI on 08/29/2016 with more recent values measured at 156 in May 2018, and 190 ml on an MRI from 3/1/21.   He is on finasteride and flomax.  He recently had a surveillance biopsy on 10/18/21 showing no cancer.  He had a surveillance cysto showing area of white tissue which was biopsied.    OBJECTIVE:  Pathology from  2/28/22 showed no cancer, but squamous metaplasia    ASSESSMENT AND PLAN: Over half of today's 5-minute visit was spent reviewing the chart, results, and counseling the patient regarding his bladder biopsy.  We are pleased to see the no cancer.  Will check a PSA in May and a surveillance cysto with another PSA in February 2023     Time of call: 3 min    Taz Chase MD

## 2022-03-14 NOTE — PROGRESS NOTES
Zohaib is a 67 year old who is being evaluated via a billable video visit.      How would you like to obtain your AVS? MyChart  If the video visit is dropped, the invitation should be resent by: Send to e-mail at: [email or web address]  Will anyone else be joining your video visit? No    Attempted video converted to phone        Video-Visit Details  HISTORY OF PRESENT ILLNESS:  Mr. Guadalupe is a 67-year-old gentleman followed in our clinic for history of [place] 3+3 prostate cancer diagnosed in 2011 for which he is on active surveillance.  He was also diagnosed with high-grade Ta bladder cancer in 11/2014 for which he had a resection followed by BCG with no evidence of recurrence to date.  The patient's prostate volume has been measured to be 232 cc on an MRI on 08/29/2016 with more recent values measured at 156 in May 2018, and 190 ml on an MRI from 3/1/21.   He is on finasteride and flomax.  He recently had a surveillance biopsy on 10/18/21 showing no cancer.  He had a surveillance cysto showing area of white tissue which was biopsied.    OBJECTIVE:  Pathology from  2/28/22 showed no cancer, but squamous metaplasia    ASSESSMENT AND PLAN: Over half of today's 5-minute visit was spent reviewing the chart, results, and counseling the patient regarding his bladder biopsy.  We are pleased to see the no cancer.  Will check a PSA in May and a surveillance cysto with another PSA in February 2023       Time of call: 3 min

## 2022-04-19 NOTE — TELEPHONE ENCOUNTER
Action    Action Taken 4/22/2022 8:46AM KEDAVID     I called [facility] [phone]- they will push the recent images over to PACS    I called pt Zohaib - all outside records are at [facility]. I resolved his imaging from [facility] in PACS     DIAGNOSIS: L knee chondral lesion/ Sarcoma    APPOINTMENT DATE: 4/25/2022   NOTES STATUS DETAILS   OFFICE NOTE from referring provider Care Everywhere ref. [place] [facility] [place]    OFFICE NOTE from other specialist Care Everywhere [facility] [place]   OPERATIVE REPORT Internal INJECTION NERVE BLOCK     MEDICATION LIST Internal    LABS     Injection Care Everywhere [facility]- Ortho 3/1/2022 - Right Knee Steroid Injection    MRI Care Everywhere MRI Knee Right 2/16/2022 in Care Everywhere- [facility]   XRAYS (IMAGES & REPORTS) Care Everywhere Xray Knee 2/25/2022 Care Everywhere- [facility]   TUMOR     PATHOLOGY  Slides & report

## 2022-04-21 ENCOUNTER — MEDICAL CORRESPONDENCE (OUTPATIENT)
Dept: HEALTH INFORMATION MANAGEMENT | Facility: CLINIC | Age: 67
End: 2022-04-21
Payer: MEDICARE

## 2022-04-25 ENCOUNTER — ANCILLARY PROCEDURE (OUTPATIENT)
Dept: GENERAL RADIOLOGY | Facility: CLINIC | Age: 67
End: 2022-04-25
Attending: ORTHOPAEDIC SURGERY
Payer: MEDICARE

## 2022-04-25 ENCOUNTER — OFFICE VISIT (OUTPATIENT)
Dept: ORTHOPEDICS | Facility: CLINIC | Age: 67
End: 2022-04-25
Payer: MEDICARE

## 2022-04-25 ENCOUNTER — PRE VISIT (OUTPATIENT)
Dept: ORTHOPEDICS | Facility: CLINIC | Age: 67
End: 2022-04-25

## 2022-04-25 VITALS — HEIGHT: 77 IN | WEIGHT: 204 LBS | BODY MASS INDEX: 24.09 KG/M2

## 2022-04-25 DIAGNOSIS — D49.2 NEOPLASM OF CARTILAGE: ICD-10-CM

## 2022-04-25 DIAGNOSIS — M94.9 CHONDRAL LESION: ICD-10-CM

## 2022-04-25 DIAGNOSIS — M94.9 CHONDRAL LESION: Primary | ICD-10-CM

## 2022-04-25 PROCEDURE — 73590 X-RAY EXAM OF LOWER LEG: CPT | Mod: LT | Performed by: RADIOLOGY

## 2022-04-25 PROCEDURE — 99204 OFFICE O/P NEW MOD 45 MIN: CPT | Performed by: ORTHOPAEDIC SURGERY

## 2022-04-25 NOTE — PROGRESS NOTES
[facility], Orthopaedic Oncology Surgery Consultation  by Abdirahman Adrian M.D.    Narendra Guadalupe MRN# [id number]    YOB: 1955     Requesting physician: MD [facility]  Tashia Dunn PAC [facility]  Lindsey Leo MD PCP            Assessment and Plan:   Assessment:  Cartilaginous tumor of left proximal tibia, ie enchondroma.  Large, yet no radiographic features to suggest chondrosarcoma.  Endosteal scalloping absent or minimally present on MR.  Unlikely to be related to prostate carcinoma.  Discussed nature of cartilage tumors with the patient.  Most likely this is solitary.  There is potential for malignant degeneration but it is rare.  Ensuring stability would be appropriate.     Plan:  1. Will obtain lateral view x-ray today.  2. Whole body Tc99 bone scan to evaluate for polyostotic disease.  Will forward results via [employer]  3. Barring any aggressive features on imaging, would recommend follow-up in 12 months with AP lateral x-ray of L proximal tibia and repeat MR of proximal tibia to ensure stability.  If aggressive features appear on imaging or if patient develops symptoms, or patient becomes candidate for TKA surgery, then consideration for biopsy or resection would be needed.  4. Return in 1 year.           History of Present Illness:   67 year old male  chief complaint    Patient is really tired and was seen for evaluation of his right knee with discomfort due to early degenerative disease and imaging studies included x-rays of both knees.  The radiograph demonstrated incidental finding of a lesion of the left proximal tibia.  Patient has no prior imaging of the left knee or tibia.  He denies any symptoms on the side as well.  No history of bone tumors.  Known history of prostate carcinoma.    Current symptoms:  Problem: Lesion of Left Proximal Tibia    Onset and duration: 2/28/2022 - incidental finding on Xray  Awakens from sleep due to sx's:   "No  Precipitating Injury:  Yes  - Injury playing [employer] Ball   Other joints or sites painful:  No  Fever: No  Appetite change or weight loss: No  History of prior or existing cancer: Yes    Background history:  DX:  1. Bladder Cancer  2. Prostate Cancer    TREATMENTS:  1. 11/21/2014 - Cystoscopy, Transurethral Resection of Bladder Tumor, Bilateral Retrograde Pyelograms and Ureteroscopy , Cystoscopy, With Retrograde Pyelogram - Bilateral Cystoureteroscopy - Bilateral, Rena Elizalde MD  2. 12/30/2014 - Cystoscopy, Transurethral Resection Of Bladder Tumor, Rena Elizalde MD   3. 4/28/2015 - Cystoscopy with Bladder Biopsy and Fulguration, Rena Elizalde MD   4. Prior history of tendon repair in the right knee.         Physical Exam:     EXAMINATION pertinent findings:   PSYCH: Pleasant, healthy-appearing, alert, oriented x3, cooperative. Normal mood and affect.  VITAL SIGNS: Height 1.956 m (6' 5\"), weight 92.5 kg (204 lb)..  Reviewed nursing intake notes.   Body mass index is 24.19 kg/m .  RESP: non labored breathing   ABD: benign, soft, non-tender, no acute peritoneal findings  SKIN: grossly normal   LYMPHATIC: grossly normal, no adenopathy, no extremity edema  NEURO: grossly normal , no motor deficits  VASCULAR: satisfactory perfusion of all extremities   MUSCULOSKELETAL:   Gait is normal.  Full range of motion of both knees with 0 to 135 degrees of flexion.  Collateral and cruciate ligament stable for both knees.  No crepitance in either knee joint.  No effusion or warmth in either knee joint.  Midline incision of the right knee is fully healed.           Data:   All laboratory data reviewed  All imaging studies reviewed by me    MRI L tibia:    IMPRESSION:  1. 5.5 x 3.8 x 4.1 cm chondroid lesion in the proximal left tibial metaphysis abuts the endosteal cortex with endosteal scalloping. Findings are consistent with low-grade chondroid lesion. Well-differentiated chondrosarcoma " difficult to entirely exclude given the degree of endosteal scalloping; however, there is no adjacent soft tissue mass or definitive cortical breach.  2. Periodic radiographic and MR follow-up suggested. Consultation with an orthopedic oncologist should be considered.        Electronically Signed: Rodney Green MD 04/15/2022 8:07 AM             DATA for DOCUMENTATION:         Past Medical History:     Patient Active Problem List   Diagnosis     Malignant neoplasm of prostate (H)     Bladder cancer (H)     Asthma     Fasting hyperglycemia     Obstructive sleep apnea syndrome     PLMD (periodic limb movement disorder)     Rosacea     Vitamin D deficiency     Malignant neoplasm of urinary bladder (H)     Prostate cancer (H)     Past Medical History:   Diagnosis Date     Fasting hyperglycemia      Obstructive sleep apnea syndrome 8/17/2010     Prostate cancer (H)      Prostate cancer (H)      Uncomplicated asthma      Vitamin D deficiency        Also see scanned health assessment forms.       Past Surgical History:     Past Surgical History:   Procedure Laterality Date     COLONOSCOPY       CYSTOSCOPY, FULGURATE BLEEDERS, EVACUATE CLOT(S), COMBINED N/A 4/28/2015    Procedure: COMBINED CYSTOSCOPY, FULGURATE BLEEDERS, EVACUATE CLOT(S);  Surgeon: Taz Chase MD;  [facility]     CYSTOSCOPY, RETROGRADES, COMBINED Bilateral 11/21/2014    Procedure: COMBINED CYSTOSCOPY, RETROGRADES;  Surgeon: Taz Chase MD;  [facility]     CYSTOSCOPY, TRANSURETHRAL RESECTION (TUR) TUMOR BLADDER, COMBINED N/A 11/21/2014    Procedure: COMBINED CYSTOSCOPY, TRANSURETHRAL RESECTION (TUR) TUMOR BLADDER;  Surgeon: Taz Chase MD;  [facility]     CYSTOSCOPY, TRANSURETHRAL RESECTION (TUR) TUMOR BLADDER, COMBINED N/A 12/30/2014    Procedure: COMBINED CYSTOSCOPY, TRANSURETHRAL RESECTION (TUR) TUMOR BLADDER;  Surgeon: Taz Chase MD;  [facility]     CYSTOSCOPY,  URETEROSCOPY, COMBINED Bilateral 11/21/2014    Procedure: COMBINED CYSTOSCOPY, URETEROSCOPY;  Surgeon: Taz Chase MD;  [facility]     HERNIA REPAIR       KNEE SURGERY      right     SURGICAL PATHOLOGY EXAM      right temple lipoma resection            Social History:     Social History     Socioeconomic History     Marital status:      Spouse name: Not on file     Number of children: Not on file     Years of education: Not on file     Highest education level: Not on file   Occupational History     Not on file   Tobacco Use     Smoking status: Never Smoker     Smokeless tobacco: Never Used   Substance and Sexual Activity     Alcohol use: Yes     Drug use: No     Sexual activity: Not on file   Other Topics Concern     Parent/sibling w/ CABG, MI or angioplasty before 65F 55M? Not Asked   Social History Narrative     Not on file     Social Determinants of Health     Financial Resource Strain: Not on file   Food Insecurity: Not on file   Transportation Needs: Not on file   Physical Activity: Not on file   Stress: Not on file   Social Connections: Not on file   Intimate Partner Violence: Not on file   Housing Stability: Not on file            Family History:     No family history on file.         Medications:     Current Outpatient Medications   Medication Sig     finasteride (PROSCAR) 5 MG tablet Take 1 tablet (5 mg) by mouth daily     tamsulosin (FLOMAX) 0.4 MG capsule Take 1 capsule (0.4 mg) by mouth daily     No current facility-administered medications for this visit.              Review of Systems:   A comprehensive 10 point review of systems (constitutional, ENT, cardiac, peripheral vascular, lymphatic, respiratory, GI, , Musculoskeletal, skin, Neurological) was performed and found to be negative except as described in this note.     See intake form completed by patient

## 2022-04-25 NOTE — LETTER
4/25/2022         RE: Narendra Guadalupe  [address]  [place] [facility] [address]        Dear Colleague,    Thank you for referring your patient, Narendra Guadalupe, to the [facility] [place]. Please see a copy of my visit note below.        [facility], Orthopaedic Oncology Surgery Consultation  by Abdirahman Adrian M.D.    Narendra Guadalupe MRN# [id number]    YOB: 1955     Requesting physician: MD [facility]  Tashia Dunn PAC [facility]  Lindsey Leo MD PCP            Assessment and Plan:   Assessment:  Cartilaginous tumor of left proximal tibia, ie enchondroma.  Large, yet no radiographic features to suggest chondrosarcoma.  Endosteal scalloping absent or minimally present on MR.  Unlikely to be related to prostate carcinoma.  Discussed nature of cartilage tumors with the patient.  Most likely this is solitary.  There is potential for malignant degeneration but it is rare.  Ensuring stability would be appropriate.     Plan:  1. Will obtain lateral view x-ray today.  2. Whole body Tc99 bone scan to evaluate for polyostotic disease.  Will forward results via [employer]  3. Barring any aggressive features on imaging, would recommend follow-up in 12 months with AP lateral x-ray of L proximal tibia and repeat MR of proximal tibia to ensure stability.  If aggressive features appear on imaging or if patient develops symptoms, or patient becomes candidate for TKA surgery, then consideration for biopsy or resection would be needed.  4. Return in 1 year.           History of Present Illness:   67 year old male  chief complaint    Patient is really tired and was seen for evaluation of his right knee with discomfort due to early degenerative disease and imaging studies included x-rays of both knees.  The radiograph demonstrated incidental finding of a lesion of the left proximal tibia.  Patient has no prior imaging of the left knee or tibia.  He  "denies any symptoms on the side as well.  No history of bone tumors.  Known history of prostate carcinoma.    Current symptoms:  Problem: Lesion of Left Proximal Tibia    Onset and duration: 2/28/2022 - incidental finding on Xray  Awakens from sleep due to sx's:  No  Precipitating Injury:  Yes  - Injury playing [employer] Ball   Other joints or sites painful:  No  Fever: No  Appetite change or weight loss: No  History of prior or existing cancer: Yes    Background history:  DX:  1. Bladder Cancer  2. Prostate Cancer    TREATMENTS:  1. 11/21/2014 - Cystoscopy, Transurethral Resection of Bladder Tumor, Bilateral Retrograde Pyelograms and Ureteroscopy , Cystoscopy, With Retrograde Pyelogram - Bilateral Cystoureteroscopy - Bilateral, Rena Elizalde MD  2. 12/30/2014 - Cystoscopy, Transurethral Resection Of Bladder Tumor, Rena Elizalde MD   3. 4/28/2015 - Cystoscopy with Bladder Biopsy and Fulguration, Rena Elizalde MD   4. Prior history of tendon repair in the right knee.         Physical Exam:     EXAMINATION pertinent findings:   PSYCH: Pleasant, healthy-appearing, alert, oriented x3, cooperative. Normal mood and affect.  VITAL SIGNS: Height 1.956 m (6' 5\"), weight 92.5 kg (204 lb)..  Reviewed nursing intake notes.   Body mass index is 24.19 kg/m .  RESP: non labored breathing   ABD: benign, soft, non-tender, no acute peritoneal findings  SKIN: grossly normal   LYMPHATIC: grossly normal, no adenopathy, no extremity edema  NEURO: grossly normal , no motor deficits  VASCULAR: satisfactory perfusion of all extremities   MUSCULOSKELETAL:   Gait is normal.  Full range of motion of both knees with 0 to 135 degrees of flexion.  Collateral and cruciate ligament stable for both knees.  No crepitance in either knee joint.  No effusion or warmth in either knee joint.  Midline incision of the right knee is fully healed.           Data:   All laboratory data reviewed  All imaging studies reviewed " by me    MRI L tibia:    IMPRESSION:  1. 5.5 x 3.8 x 4.1 cm chondroid lesion in the proximal left tibial metaphysis abuts the endosteal cortex with endosteal scalloping. Findings are consistent with low-grade chondroid lesion. Well-differentiated chondrosarcoma difficult to entirely exclude given the degree of endosteal scalloping; however, there is no adjacent soft tissue mass or definitive cortical breach.  2. Periodic radiographic and MR follow-up suggested. Consultation with an orthopedic oncologist should be considered.        Electronically Signed: Rodney Green MD 04/15/2022 8:07 AM             DATA for DOCUMENTATION:         Past Medical History:     Patient Active Problem List   Diagnosis     Malignant neoplasm of prostate (H)     Bladder cancer (H)     Asthma     Fasting hyperglycemia     Obstructive sleep apnea syndrome     PLMD (periodic limb movement disorder)     Rosacea     Vitamin D deficiency     Malignant neoplasm of urinary bladder (H)     Prostate cancer (H)     Past Medical History:   Diagnosis Date     Fasting hyperglycemia      Obstructive sleep apnea syndrome 8/17/2010     Prostate cancer (H)      Prostate cancer (H)      Uncomplicated asthma      Vitamin D deficiency        Also see scanned health assessment forms.       Past Surgical History:     Past Surgical History:   Procedure Laterality Date     COLONOSCOPY       CYSTOSCOPY, FULGURATE BLEEDERS, EVACUATE CLOT(S), COMBINED N/A 4/28/2015    Procedure: COMBINED CYSTOSCOPY, FULGURATE BLEEDERS, EVACUATE CLOT(S);  Surgeon: Taz Chase MD;  [facility]     CYSTOSCOPY, RETROGRADES, COMBINED Bilateral 11/21/2014    Procedure: COMBINED CYSTOSCOPY, RETROGRADES;  Surgeon: Taz Chase MD;  [facility]     CYSTOSCOPY, TRANSURETHRAL RESECTION (TUR) TUMOR BLADDER, COMBINED N/A 11/21/2014    Procedure: COMBINED CYSTOSCOPY, TRANSURETHRAL RESECTION (TUR) TUMOR BLADDER;  Surgeon: Taz Chase MD;   [facility]     CYSTOSCOPY, TRANSURETHRAL RESECTION (TUR) TUMOR BLADDER, COMBINED N/A 12/30/2014    Procedure: COMBINED CYSTOSCOPY, TRANSURETHRAL RESECTION (TUR) TUMOR BLADDER;  Surgeon: Taz Chase MD;  [facility]     CYSTOSCOPY, URETEROSCOPY, COMBINED Bilateral 11/21/2014    Procedure: COMBINED CYSTOSCOPY, URETEROSCOPY;  Surgeon: Taz Chase MD;  [facility]     HERNIA REPAIR       KNEE SURGERY      right     SURGICAL PATHOLOGY EXAM      right temple lipoma resection            Social History:     Social History     Socioeconomic History     Marital status:      Spouse name: Not on file     Number of children: Not on file     Years of education: Not on file     Highest education level: Not on file   Occupational History     Not on file   Tobacco Use     Smoking status: Never Smoker     Smokeless tobacco: Never Used   Substance and Sexual Activity     Alcohol use: Yes     Drug use: No     Sexual activity: Not on file   Other Topics Concern     Parent/sibling w/ CABG, MI or angioplasty before 65F 55M? Not Asked   Social History Narrative     Not on file     Social Determinants of Health     Financial Resource Strain: Not on file   Food Insecurity: Not on file   Transportation Needs: Not on file   Physical Activity: Not on file   Stress: Not on file   Social Connections: Not on file   Intimate Partner Violence: Not on file   Housing Stability: Not on file            Family History:     No family history on file.         Medications:     Current Outpatient Medications   Medication Sig     finasteride (PROSCAR) 5 MG tablet Take 1 tablet (5 mg) by mouth daily     tamsulosin (FLOMAX) 0.4 MG capsule Take 1 capsule (0.4 mg) by mouth daily     No current facility-administered medications for this visit.              Review of Systems:   A comprehensive 10 point review of systems (constitutional, ENT, cardiac, peripheral vascular, lymphatic, respiratory, GI, ,  Musculoskeletal, skin, Neurological) was performed and found to be negative except as described in this note.     See intake form completed by patient

## 2022-04-28 ENCOUNTER — MYC MEDICAL ADVICE (OUTPATIENT)
Dept: UROLOGY | Facility: CLINIC | Age: 67
End: 2022-04-28
Payer: MEDICARE

## 2022-05-04 ENCOUNTER — TELEPHONE (OUTPATIENT)
Dept: ORTHOPEDICS | Facility: CLINIC | Age: 67
End: 2022-05-04
Payer: MEDICARE

## 2022-10-10 ENCOUNTER — TELEPHONE (OUTPATIENT)
Dept: ORTHOPEDICS | Facility: CLINIC | Age: 67
End: 2022-10-10

## 2022-12-02 ENCOUNTER — HOSPITAL ENCOUNTER (OUTPATIENT)
Dept: NUCLEAR MEDICINE | Facility: CLINIC | Age: 67
Setting detail: NUCLEAR MEDICINE
Discharge: HOME | End: 2022-12-02
Attending: ORTHOPAEDIC SURGERY
Payer: MEDICARE

## 2022-12-02 DIAGNOSIS — D49.2 NEOPLASM OF CARTILAGE: ICD-10-CM

## 2022-12-02 PROCEDURE — 78306 BONE IMAGING WHOLE BODY: CPT | Mod: 26

## 2022-12-02 PROCEDURE — 343N000001 HC RX 343: Performed by: ORTHOPAEDIC SURGERY

## 2022-12-02 PROCEDURE — 78306 BONE IMAGING WHOLE BODY: CPT | Mod: MG

## 2022-12-02 PROCEDURE — 78830 RP LOCLZJ TUM SPECT W/CT 1: CPT | Mod: MG

## 2022-12-02 PROCEDURE — A9503 TC99M MEDRONATE: HCPCS | Performed by: ORTHOPAEDIC SURGERY

## 2022-12-02 PROCEDURE — G1004 CDSM NDSC: HCPCS | Mod: GC

## 2022-12-02 RX ORDER — TC 99M MEDRONATE 20 MG/10ML
22.5-27.5 INJECTION, POWDER, LYOPHILIZED, FOR SOLUTION INTRAVENOUS ONCE
Status: COMPLETED | OUTPATIENT
Start: 2022-12-02 | End: 2022-12-02

## 2022-12-02 RX ADMIN — TC 99M MEDRONATE 23.3 MCI.: 20 INJECTION, POWDER, LYOPHILIZED, FOR SOLUTION INTRAVENOUS at 07:50

## 2022-12-07 ENCOUNTER — LAB (OUTPATIENT)
Dept: LAB | Facility: CLINIC | Age: 67
End: 2022-12-07
Payer: MEDICARE

## 2022-12-07 DIAGNOSIS — C61 MALIGNANT NEOPLASM OF PROSTATE (H): ICD-10-CM

## 2022-12-07 LAB — PSA SERPL-MCNC: 5.44 NG/ML (ref 0–4.5)

## 2022-12-07 PROCEDURE — 36415 COLL VENOUS BLD VENIPUNCTURE: CPT | Performed by: PATHOLOGY

## 2022-12-07 PROCEDURE — 84153 ASSAY OF PSA TOTAL: CPT | Performed by: PATHOLOGY

## 2022-12-09 NOTE — RESULT ENCOUNTER NOTE
Dear Narendra Guadalupe:    I saw your bone scan and there are no findings to suggest other cartilaginous tumors within the other skeletal bones.  Therefore, my recommendations remain the same, i.e. follow-up in 12 months with AP lateral x-ray of L proximal tibia and repeat MR of proximal tibia.    Best regards,    MD Shaniqua Gamble Family Professor  Oncology and Adult Reconstructive Surgery  Dept Orthopaedic Surgery, [facility]  [phone] office  [email or web address]

## 2022-12-29 ENCOUNTER — PRE VISIT (OUTPATIENT)
Dept: UROLOGY | Facility: CLINIC | Age: 67
End: 2022-12-29

## 2022-12-29 NOTE — TELEPHONE ENCOUNTER
Reason for visit: cystoscopy      Dx/Hx/Sx: bladder cancer, prostate cancer     Records/imaging/labs/orders: PSA in epic    At Rooming: collect urine

## 2023-02-13 ENCOUNTER — OFFICE VISIT (OUTPATIENT)
Dept: UROLOGY | Facility: CLINIC | Age: 68
End: 2023-02-13
Payer: MEDICARE

## 2023-02-13 VITALS
DIASTOLIC BLOOD PRESSURE: 85 MMHG | WEIGHT: 198 LBS | HEIGHT: 76 IN | HEART RATE: 65 BPM | BODY MASS INDEX: 24.11 KG/M2 | SYSTOLIC BLOOD PRESSURE: 126 MMHG

## 2023-02-13 DIAGNOSIS — C61 MALIGNANT NEOPLASM OF PROSTATE (H): Primary | ICD-10-CM

## 2023-02-13 DIAGNOSIS — C67.9 MALIGNANT NEOPLASM OF URINARY BLADDER, UNSPECIFIED SITE (H): ICD-10-CM

## 2023-02-13 PROCEDURE — 88112 CYTOPATH CELL ENHANCE TECH: CPT | Mod: 26 | Performed by: STUDENT IN AN ORGANIZED HEALTH CARE EDUCATION/TRAINING PROGRAM

## 2023-02-13 PROCEDURE — 88112 CYTOPATH CELL ENHANCE TECH: CPT | Mod: TC | Performed by: UROLOGY

## 2023-02-13 PROCEDURE — 99207 PR NO CHARGE LOS: CPT | Performed by: UROLOGY

## 2023-02-13 RX ORDER — LIDOCAINE HYDROCHLORIDE 20 MG/ML
JELLY TOPICAL ONCE
Status: COMPLETED | OUTPATIENT
Start: 2023-02-13 | End: 2023-02-13

## 2023-02-13 RX ADMIN — LIDOCAINE HYDROCHLORIDE: 20 JELLY TOPICAL at 08:45

## 2023-02-13 ASSESSMENT — PAIN SCALES - GENERAL: PAINLEVEL: NO PAIN (0)

## 2023-02-13 NOTE — PROGRESS NOTES
CC: prostate and bladder cancer    HISTORY OF PRESENT ILLNESS:  Mr. Guadalupe is a 67-year-old gentleman followed in our clinic for history of Patricio 3+3 prostate cancer diagnosed in 2011 for which he is on active surveillance.  He was also diagnosed with high-grade Ta bladder cancer in 11/2014 for which he had a resection followed by BCG with no evidence of recurrence to date.  The patient's prostate volume has been measured to be 232 cc on an MRI on 08/29/2016 with more recent values measured at 156 in May 2018, and 190 ml on an MRI from 3/1/21.   He is on finasteride and flomax.  He recently had a surveillance biopsy on 10/18/21 showing no cancer.  He had a surveillance cysto showing area of white tissue which was biopsied and was negative for cancer.     OBJECTIVE:  PSA from 12/7/22 is 5.44 (10.88 adjusted for finasteride) ng/mL    Cystoscopy: after informed consent was obtained, the patient was prepped and draped in standard sterile fashion. The flexible cystoscope was introduced into the patient's urethra without difficulty. There were no strictures in the urethra. Upon entering the bladder, the UOs were orthotopic and effluxing clear urine. There was again seen a white patch that had been previously biopsied that was squamous metaplasia.  Again was noted the very large median lobe.  There were no other no mucosal lesions, stones or foreign objects noted in the bladder. The remainder of the exam was normal.     ASSESSMENT AND PLAN: Over half of today's 15-minute visit was spent reviewing the chart, results, and counseling the patient regarding his prostate and bladder cancers.  We are pleased to see the no cancer on the cysto.  Will repeat the cysto in 1 year.  Will check a PSA in 6 months and a surveillance cysto with another PSA in February 2024

## 2023-02-13 NOTE — LETTER
2/13/2023       RE: Narendra Guadalupe  [address] [place] [facility] [address]     Dear Colleague,    Thank you for referring your patient, Narendra Guadalupe, to the [facility] [place] [facility]. Please see a copy of my visit note below.    CC: prostate and bladder cancer    HISTORY OF PRESENT ILLNESS:  Mr. Guadalupe is a 67-year-old gentleman followed in our clinic for history of Patricio 3+3 prostate cancer diagnosed in 2011 for which he is on active surveillance.  He was also diagnosed with high-grade Ta bladder cancer in 11/2014 for which he had a resection followed by BCG with no evidence of recurrence to date.  The patient's prostate volume has been measured to be 232 cc on an MRI on 08/29/2016 with more recent values measured at 156 in May 2018, and 190 ml on an MRI from 3/1/21.   He is on finasteride and flomax.  He recently had a surveillance biopsy on 10/18/21 showing no cancer.  He had a surveillance cysto showing area of white tissue which was biopsied and was negative for cancer.     OBJECTIVE:  PSA from 12/7/22 is 5.44 (10.88 adjusted for finasteride) ng/mL    Cystoscopy: after informed consent was obtained, the patient was prepped and draped in standard sterile fashion. The flexible cystoscope was introduced into the patient's urethra without difficulty. There were no strictures in the urethra. Upon entering the bladder, the UOs were orthotopic and effluxing clear urine. There was again seen a white patch that had been previously biopsied that was squamous metaplasia.  Again was noted the very large median lobe.  There were no other no mucosal lesions, stones or foreign objects noted in the bladder. The remainder of the exam was normal.     ASSESSMENT AND PLAN: Over half of today's 15-minute visit was spent reviewing the chart, results, and counseling the patient regarding his prostate and bladder cancers.  We are pleased  to see the no cancer on the cysto.  Will repeat the cysto in 1 year.  Will check a PSA in 6 months and a surveillance cysto with another PSA in February 2024      Again, thank you for allowing me to participate in the care of your patient.      Sincerely,    Taz Chase MD

## 2023-02-13 NOTE — NURSING NOTE
"Chief Complaint   Patient presents with     Cystoscopy       Blood pressure 126/85, pulse 65, height 1.93 m (6' 4\"), weight 89.8 kg (198 lb). Body mass index is 24.1 kg/m .    Patient Active Problem List   Diagnosis     Malignant neoplasm of prostate (H)     Bladder cancer (H)     Asthma     Fasting hyperglycemia     Obstructive sleep apnea syndrome     PLMD (periodic limb movement disorder)     Rosacea     Vitamin D deficiency     Malignant neoplasm of urinary bladder (H)     Prostate cancer (H)       No Known Allergies    Current Outpatient Medications   Medication Sig Dispense Refill     finasteride (PROSCAR) 5 MG tablet Take 1 tablet (5 mg) by mouth daily 90 tablet 2     tamsulosin (FLOMAX) 0.4 MG capsule Take 1 capsule (0.4 mg) by mouth daily 90 capsule 2       Social History     Tobacco Use     Smoking status: Never     Smokeless tobacco: Never   Substance Use Topics     Alcohol use: Yes     Drug use: No       Invasive Procedure Safety Checklist:    Procedure: Cystoscopy    Action: Complete sections and checkboxes as appropriate.    Pre-procedure:  1. Patient ID Verified with 2 identifiers ([place] and  or MRN) : YES    2. Procedure and site verified with patient/designee (when able) : YES    3. Accurate consent documentation in medical record : YES    4. H&P (or appropriate assessment) documented in medical record : N/A  H&P must be up to 30 days prior to procedure an updated within 24 hours of                 Procedure as applicable.     5. Relevant diagnostic and radiology test results appropriately labeled and displayed as applicable : YES    6. Blood products, implants, devices, and/or special equipment available for the procedure as applicable : YES    7. Procedure site(s) marked with provider initials [Exclusions: none] : NO    8. Marking not required. Reason : Yes  Procedure does not require site marking    Time Out:     Time-Out performed immediately prior to starting procedure, including verbal and " active participation of all team members addressing: YES    1. Correct patient identity.  2. Confirmed that the correct side and site are marked.  3. An accurate procedure to be done.  4. Agreement on the procedure to be done.  5. Correct patient position.  6. Relevant images and results are properly labeled and appropriately displayed.  7. The need to administer antibiotics or fluids for irrigation purposes during the procedure as applicable.  8. Safety precautions based on patient history or medication use.    During Procedure: Verification of correct person, site, and procedure occurs any time the responsibility for care of the patient is transferred to another member of the care team.    The following medication was given:     MEDICATION:  Lidocaine without epinephrine 2% jelly  ROUTE: urethral   SITE: urethral   DOSE: 10 mL  LOT #: [id number]  : [employer]  EXPIRATION DATE: 8-24  NDC#: [address]-9   Was there drug waste? No    Prior to med admin, verified patient identity using patient's name and date of birth.  Due to med administration, patient instructed to remain in clinic for 15 minutes  afterwards, and to report any adverse reaction to me immediately.    Drug Amount Wasted:  None.  Vial/Syringe: Syringe      Hemant Guerrero  2/13/2023  8:33 AM

## 2023-02-14 LAB
PATH REPORT.COMMENTS IMP SPEC: NORMAL
PATH REPORT.FINAL DX SPEC: NORMAL
PATH REPORT.GROSS SPEC: NORMAL
PATH REPORT.MICROSCOPIC SPEC OTHER STN: NORMAL
PATH REPORT.RELEVANT HX SPEC: NORMAL

## 2023-03-02 DIAGNOSIS — N40.0 BPH (BENIGN PROSTATIC HYPERPLASIA): Primary | ICD-10-CM

## 2023-03-02 DIAGNOSIS — C61 MALIGNANT NEOPLASM OF PROSTATE (H): ICD-10-CM

## 2023-03-04 RX ORDER — FINASTERIDE 5 MG/1
1 TABLET, FILM COATED ORAL DAILY
Qty: 90 TABLET | Refills: 3 | Status: SHIPPED | OUTPATIENT
Start: 2023-03-04 | End: 2023-03-04

## 2023-03-04 NOTE — TELEPHONE ENCOUNTER
2/13/2023  [facility] [place]     Taz Chase MD  Urology    finasteride (PROSCAR) 5 MG tablet    Last Written Prescription Date:  1/16/22  Last Fill Quantity: 90,   # refills: 2      tamsulosin (FLOMAX) 0.4 MG capsule     Last Written Prescription Date:  1/16/22  Last Fill Quantity:90   # refills: 2    Routing refill request to provider for review/approval because: gap in med  rf.

## 2023-03-14 ENCOUNTER — MYC MEDICAL ADVICE (OUTPATIENT)
Dept: UROLOGY | Facility: CLINIC | Age: 68
End: 2023-03-14
Payer: MEDICARE

## 2023-03-14 DIAGNOSIS — R35.0 URINARY FREQUENCY: ICD-10-CM

## 2023-03-14 DIAGNOSIS — R39.15 URGENCY OF URINATION: ICD-10-CM

## 2023-03-14 DIAGNOSIS — R31.9 HEMATURIA: Primary | ICD-10-CM

## 2023-03-14 DIAGNOSIS — R30.0 DYSURIA: ICD-10-CM

## 2023-03-16 ENCOUNTER — TELEPHONE (OUTPATIENT)
Dept: UROLOGY | Facility: CLINIC | Age: 68
End: 2023-03-16
Payer: MEDICARE

## 2023-03-16 DIAGNOSIS — R30.0 DYSURIA: Primary | ICD-10-CM

## 2023-03-16 DIAGNOSIS — R31.9 HEMATURIA: ICD-10-CM

## 2023-03-16 NOTE — TELEPHONE ENCOUNTER
Pt called stating he would like for someone to reach out to him through [employer] as he hasn't been successful w/ getting ahold of the clinic via phone. Please reach out to pt via [employer] to further discuss. Thanks

## 2023-03-16 NOTE — TELEPHONE ENCOUNTER
Attempted to call pt. Left detailed message to call clinic back at [phone].     Davina Franklin, MSN RN

## 2023-03-16 NOTE — TELEPHONE ENCOUNTER
[facility] Call Center    Phone Message    May a detailed message be left on voicemail: yes     Reason for Call: Patient sent [employer] message about blood in urine and has not heard back. Please reach out to patient via [employer]. Thank you    Action Taken: Message routed to:  [facility] URO    Travel Screening: Not Applicable

## 2023-03-16 NOTE — TELEPHONE ENCOUNTER
Attempted to call pt. Left detailed message to call clinic back at [phone].     Davina Franklin, MSN RN

## 2023-03-17 ENCOUNTER — LAB (OUTPATIENT)
Dept: LAB | Facility: CLINIC | Age: 68
End: 2023-03-17
Payer: MEDICARE

## 2023-03-17 DIAGNOSIS — R35.0 URINARY FREQUENCY: ICD-10-CM

## 2023-03-17 DIAGNOSIS — R31.9 HEMATURIA: ICD-10-CM

## 2023-03-17 DIAGNOSIS — R39.15 URGENCY OF URINATION: ICD-10-CM

## 2023-03-17 DIAGNOSIS — R30.0 DYSURIA: ICD-10-CM

## 2023-03-17 LAB
ALBUMIN UR-MCNC: 20 MG/DL
APPEARANCE UR: CLEAR
BILIRUB UR QL STRIP: NEGATIVE
COLOR UR AUTO: YELLOW
GLUCOSE UR STRIP-MCNC: NEGATIVE MG/DL
HGB UR QL STRIP: ABNORMAL
KETONES UR STRIP-MCNC: NEGATIVE MG/DL
LEUKOCYTE ESTERASE UR QL STRIP: ABNORMAL
MUCOUS THREADS #/AREA URNS LPF: PRESENT /LPF
NITRATE UR QL: NEGATIVE
PH UR STRIP: 5.5 [PH] (ref 5–7)
RBC URINE: 6 /HPF
SP GR UR STRIP: 1.02 (ref 1–1.03)
UROBILINOGEN UR STRIP-MCNC: NORMAL MG/DL
WBC URINE: 36 /HPF

## 2023-03-17 PROCEDURE — 81001 URINALYSIS AUTO W/SCOPE: CPT | Performed by: PATHOLOGY

## 2023-03-17 PROCEDURE — 87088 URINE BACTERIA CULTURE: CPT | Performed by: UROLOGY

## 2023-03-20 ENCOUNTER — TELEPHONE (OUTPATIENT)
Dept: UROLOGY | Facility: CLINIC | Age: 68
End: 2023-03-20
Payer: MEDICARE

## 2023-03-20 DIAGNOSIS — R30.0 DYSURIA: Primary | ICD-10-CM

## 2023-03-20 RX ORDER — SULFAMETHOXAZOLE/TRIMETHOPRIM 800-160 MG
1 TABLET ORAL 2 TIMES DAILY
Qty: 10 TABLET | Refills: 0 | Status: SHIPPED | OUTPATIENT
Start: 2023-03-20 | End: 2023-03-25

## 2023-03-20 NOTE — CONFIDENTIAL NOTE
Spoke with patient and sent abx per protocol to pharmacy of patient's choice.      Maximino Tee, RN  RN Care Coordinator - Urology

## 2023-03-20 NOTE — CONFIDENTIAL NOTE
Called and left VM for patient regarding UA results.  This author's direct line provided for future questions/concerns.    Maximino Tee, RN  RN Care Coordinator - Urology

## 2023-03-20 NOTE — TELEPHONE ENCOUNTER
Maximino followed up with pt in regards to results and treatment. Documentation in another encounter.

## 2023-03-21 LAB — BACTERIA UR CULT: ABNORMAL

## 2023-03-31 ENCOUNTER — MYC MEDICAL ADVICE (OUTPATIENT)
Dept: UROLOGY | Facility: CLINIC | Age: 68
End: 2023-03-31
Payer: MEDICARE

## 2023-03-31 DIAGNOSIS — C61 MALIGNANT NEOPLASM OF PROSTATE (H): Primary | ICD-10-CM

## 2023-03-31 RX ORDER — FINASTERIDE 5 MG/1
5 TABLET, FILM COATED ORAL DAILY
Qty: 90 TABLET | Refills: 3 | Status: SHIPPED | OUTPATIENT
Start: 2023-03-31 | End: 2024-03-25

## 2023-03-31 RX ORDER — TAMSULOSIN HYDROCHLORIDE 0.4 MG/1
0.4 CAPSULE ORAL DAILY
Qty: 90 CAPSULE | Refills: 3 | Status: SHIPPED | OUTPATIENT
Start: 2023-03-31 | End: 2024-03-25

## 2023-04-15 ENCOUNTER — HEALTH MAINTENANCE LETTER (OUTPATIENT)
Age: 68
End: 2023-04-15

## 2023-05-26 RX ORDER — TAMSULOSIN HYDROCHLORIDE 0.4 MG/1
0.4 CAPSULE ORAL DAILY
Qty: 90 CAPSULE | Refills: 2 | Status: SHIPPED | OUTPATIENT
Start: 2023-05-26 | End: 2024-08-29

## 2023-05-26 RX ORDER — FINASTERIDE 5 MG/1
5 TABLET, FILM COATED ORAL DAILY
Qty: 30 TABLET | Refills: 3 | Status: SHIPPED | OUTPATIENT
Start: 2023-05-26 | End: 2023-08-24

## 2023-05-26 RX ORDER — FINASTERIDE 5 MG/1
1 TABLET, FILM COATED ORAL DAILY
Qty: 90 TABLET | Refills: 3 | Status: SHIPPED | OUTPATIENT
Start: 2023-05-26 | End: 2024-08-29

## 2023-09-27 DIAGNOSIS — M94.9 CHONDRAL LESION: Primary | ICD-10-CM

## 2023-09-27 NOTE — PROGRESS NOTES
Placing orders per 's note from 12/2/22         Dear Narendra Guadalupe:     I saw your bone scan and there are no findings to suggest other cartilaginous tumors within the other skeletal bones.  Therefore, my recommendations remain the same, i.e. follow-up in 12 months with AP lateral x-ray of L proximal tibia and repeat MR of proximal tibia.     Best regards,     Abdirahman Adrian MD

## 2023-10-11 ENCOUNTER — MYC MEDICAL ADVICE (OUTPATIENT)
Dept: ORTHOPEDICS | Facility: CLINIC | Age: 68
End: 2023-10-11
Payer: MEDICARE

## 2023-10-11 DIAGNOSIS — M94.9 CHONDRAL LESION: Primary | ICD-10-CM

## 2023-10-18 ENCOUNTER — TELEPHONE (OUTPATIENT)
Dept: ORTHOPEDICS | Facility: CLINIC | Age: 68
End: 2023-10-18
Payer: MEDICARE

## 2023-10-18 RX ORDER — DIAZEPAM 5 MG
TABLET ORAL
Qty: 1 TABLET | Refills: 0 | Status: SHIPPED | OUTPATIENT
Start: 2023-10-18

## 2023-10-18 NOTE — TELEPHONE ENCOUNTER
- A call was placed to the patient.     - Confirm order would be changed to LEFT side and I would call to schedule.     - Patient verbalized understanding of plan and all questions were answered. Call back number to clinic was given and patient was told to call if they had an further questions.

## 2023-10-18 NOTE — TELEPHONE ENCOUNTER
[facility] Call Center    Phone Message    May a detailed message be left on voicemail: yes     Reason for Call: Order(s): Other:   Reason for requested: MRI order incorrect needed for LT side, please place new order and let patent know  Date needed: asap  Provider name: Dr. Adrian     Action Taken: Message routed to:  Other: Nguyễn    Travel Screening: Not Applicable

## 2023-10-20 ENCOUNTER — ANCILLARY PROCEDURE (OUTPATIENT)
Dept: MRI IMAGING | Facility: CLINIC | Age: 68
End: 2023-10-20
Attending: ORTHOPAEDIC SURGERY
Payer: MEDICARE

## 2023-10-20 DIAGNOSIS — M94.9 CHONDRAL LESION: ICD-10-CM

## 2023-10-20 PROCEDURE — A9585 GADOBUTROL INJECTION: HCPCS | Mod: JZ | Performed by: RADIOLOGY

## 2023-10-20 PROCEDURE — 73720 MRI LWR EXTREMITY W/O&W/DYE: CPT | Mod: LT | Performed by: RADIOLOGY

## 2023-10-20 PROCEDURE — G1010 CDSM STANSON: HCPCS | Performed by: RADIOLOGY

## 2023-10-20 RX ORDER — GADOBUTROL 604.72 MG/ML
10 INJECTION INTRAVENOUS ONCE
Status: COMPLETED | OUTPATIENT
Start: 2023-10-20 | End: 2023-10-20

## 2023-10-20 RX ADMIN — GADOBUTROL 9 ML: 604.72 INJECTION INTRAVENOUS at 17:18

## 2023-10-26 ENCOUNTER — VIRTUAL VISIT (OUTPATIENT)
Dept: ORTHOPEDICS | Facility: CLINIC | Age: 68
End: 2023-10-26
Payer: MEDICARE

## 2023-10-26 VITALS — HEIGHT: 75 IN | BODY MASS INDEX: 24 KG/M2 | WEIGHT: 193 LBS

## 2023-10-26 DIAGNOSIS — D16.9 ENCHONDROMA OF BONE: Primary | ICD-10-CM

## 2023-10-26 PROCEDURE — 99213 OFFICE O/P EST LOW 20 MIN: CPT | Mod: 95 | Performed by: ORTHOPAEDIC SURGERY

## 2023-10-26 ASSESSMENT — PAIN SCALES - GENERAL: PAINLEVEL: NO PAIN (0)

## 2023-10-26 NOTE — LETTER
10/26/2023         RE: Narendra Guadalupe  [address]  [facility] [address]      Virtual Visit Details    Type of service:  Video Visit   Video Start Time:1645  Video End Time:4:56 PM  Non video work time 5 min  Total time 16 min  Originating Location (pt. Location): Home    Distant Location (provider location):  On-site  Platform used for Video Visit: [facility], Orthopaedic Oncology Surgery Consultation  by Abdirahman Adrian M.D.    Narendra Guadalupe MRN# [id number]    YOB: 1955     Requesting physician: Manda Knapp MD [facility]  Tashia Dunn PAC [facility]  Lindsey Leo MD PCP            Assessment and Plan:   Assessment:  Stable appearance, cartilaginous tumor of left proximal tibia, ie enchondroma.  Large, yet no radiographic features to suggest chondrosarcoma.  Endosteal scalloping absent or minimally present on MR.  Unlikely to be related to prostate carcinoma.  Discussed nature of cartilage tumors with the patient.  Most likely this is solitary.  There is potential for malignant degeneration but it is rare.  Ensuring stability would be appropriate.     Plan:  Reviewed options of surgical excision vs surveillance.  Pt wishes to continue watchful waiting and active surveillance.  Clinic staff to contact pt to arrange return in 1 year with MRI L proximal tibia beforehand.    Abdirahman Adrian MD  [facility] Family Professor  Oncology and Adult Reconstructive Surgery  Dept Orthopaedic Surgery, [facility]  [phone] office, [phone] pager  [email or web address]             History of Present Illness:   67 year old male  chief complaint    Pt seen virtually to follow up lesion L prox tibia.  Radiographic dx enchondroma.  Presently asymptomatic.    Background history:  DX:  Bladder Cancer  Prostate Cancer    TREATMENTS:  11/21/2014 - Cystoscopy, Transurethral Resection of Bladder Tumor, Bilateral Retrograde Pyelograms and Ureteroscopy ,  Cystoscopy, With Retrograde Pyelogram - Bilateral Cystoureteroscopy - Bilateral, Rena Elizalde MD  12/30/2014 - Cystoscopy, Transurethral Resection Of Bladder Tumor, Rena Elizalde MD   4/28/2015 - Cystoscopy with Bladder Biopsy and Fulguration, Rena Elizalde MD   Prior history of tendon repair in the right knee.         Physical Exam:     EXAMINATION pertinent findings:   Deferred due to virtual visit.           Data:   All laboratory data reviewed  All imaging studies reviewed by me    MRI L tibia from 2022 compared to 10/2023 study..  stable appearance without any significant change over time.                  Abdirahman Adrian MD

## 2023-10-26 NOTE — LETTER
10/26/2023         RE: Narendra Guadalupe  [address]  [facility] [address]        Dear Colleague,    Thank you for referring your patient, Narendra Guadalupe, to the [facility] [place]. Please see a copy of my visit note below.    Virtual Visit Details    Type of service:  Video Visit   Video Start Time:1645  Video End Time:4:56 PM  Non video work time 5 min  Total time 16 min  Originating Location (pt. Location): Home    Distant Location (provider location):  On-site  Platform used for Video Visit: [facility], Orthopaedic Oncology Surgery Consultation  by Abdirahman Adrian M.D.    Narendra Guadalupe MRN# [id number]    YOB: 1955     Requesting physician: MD [facility]  Tashia Dunn PAC [facility]  Lindsey Leo MD PCP            Assessment and Plan:   Assessment:  Stable appearance, cartilaginous tumor of left proximal tibia, ie enchondroma.  Large, yet no radiographic features to suggest chondrosarcoma.  Endosteal scalloping absent or minimally present on MR.  Unlikely to be related to prostate carcinoma.  Discussed nature of cartilage tumors with the patient.  Most likely this is solitary.  There is potential for malignant degeneration but it is rare.  Ensuring stability would be appropriate.     Plan:  Reviewed options of surgical excision vs surveillance.  Pt wishes to continue watchful waiting and active surveillance.  Clinic staff to contact pt to arrange return in 1 year with MRI L proximal tibia beforehand.    MD Shaniqua Gamble Family Professor  Oncology and Adult Reconstructive Surgery  Dept Orthopaedic Surgery, [facility]  [phone] office, [phone] pager  [email or web address]             History of Present Illness:   67 year old male  chief complaint    Pt seen virtually to follow up lesion L prox tibia.  Radiographic dx enchondroma.  Presently asymptomatic.    Background  history:  DX:  Bladder Cancer  Prostate Cancer    TREATMENTS:  11/21/2014 - Cystoscopy, Transurethral Resection of Bladder Tumor, Bilateral Retrograde Pyelograms and Ureteroscopy , Cystoscopy, With Retrograde Pyelogram - Bilateral Cystoureteroscopy - Bilateral, Rena Elizalde MD  12/30/2014 - Cystoscopy, Transurethral Resection Of Bladder Tumor, Rena Elizalde MD   4/28/2015 - Cystoscopy with Bladder Biopsy and Fulguration, Rena Elizalde MD   Prior history of tendon repair in the right knee.         Physical Exam:     EXAMINATION pertinent findings:   Deferred due to virtual visit.           Data:   All laboratory data reviewed  All imaging studies reviewed by me    MRI L tibia from 2022 compared to 10/2023 study..  stable appearance without any significant change over time.

## 2023-10-26 NOTE — PROGRESS NOTES
Virtual Visit Details    Type of service:  Video Visit   Video Start Time:1645  Video End Time:4:56 PM  Non video work time 5 min  Total time 16 min  Originating Location (pt. Location): Home    Distant Location (provider location):  On-site  Platform used for Video Visit: [facility], Orthopaedic Oncology Surgery Consultation  by Abdirahman Adrian M.D.    Narendra Guadalupe MRN# [id number]    YOB: 1955     Requesting physician: MD [facility]  Tashia Dunn, PAC [facility]  Lindsey Leo MD PCP            Assessment and Plan:   Assessment:  Stable appearance, cartilaginous tumor of left proximal tibia, ie enchondroma.  Large, yet no radiographic features to suggest chondrosarcoma.  Endosteal scalloping absent or minimally present on MR.  Unlikely to be related to prostate carcinoma.  Discussed nature of cartilage tumors with the patient.  Most likely this is solitary.  There is potential for malignant degeneration but it is rare.  Ensuring stability would be appropriate.     Plan:  Reviewed options of surgical excision vs surveillance.  Pt wishes to continue watchful waiting and active surveillance.  Clinic staff to contact pt to arrange return in 1 year with MRI L proximal tibia beforehand.    Abdirahman Adrian MD  [facility] Family Professor  Oncology and Adult Reconstructive Surgery  Dept Orthopaedic Surgery, [facility]  [phone] office, [phone] pager  [email or web address]             History of Present Illness:   67 year old male  chief complaint    Pt seen virtually to follow up lesion L prox tibia.  Radiographic dx enchondroma.  Presently asymptomatic.    Background history:  DX:  Bladder Cancer  Prostate Cancer    TREATMENTS:  11/21/2014 - Cystoscopy, Transurethral Resection of Bladder Tumor, Bilateral Retrograde Pyelograms and Ureteroscopy , Cystoscopy, With Retrograde Pyelogram - Bilateral Cystoureteroscopy - Bilateral, Rena Mcghee  MD Tonio  12/30/2014 - Cystoscopy, Transurethral Resection Of Bladder Tumor, Rena Elizalde MD   4/28/2015 - Cystoscopy with Bladder Biopsy and Fulguration, Rena Elizalde MD   Prior history of tendon repair in the right knee.         Physical Exam:     EXAMINATION pertinent findings:   Deferred due to virtual visit.           Data:   All laboratory data reviewed  All imaging studies reviewed by me    MRI L tibia from 2022 compared to 10/2023 study..  stable appearance without any significant change over time.

## 2023-10-26 NOTE — NURSING NOTE
Is the patient currently in the state of [place]? YES    Visit mode:VIDEO    If the visit is dropped, the patient can be reconnected by: VIDEO VISIT: Send to e-mail at: [email or web address]    Will anyone else be joining the visit? NO  (If patient encounters technical issues they should call [phone]    How would you like to obtain your AVS? MyChart    Are changes needed to the allergy or medication list? Pt stated no med changes    Reason for visit: RECHECK    No other vitals to report per pt    Darlyn BIGGSF

## 2023-10-26 NOTE — LETTER
10/26/2023       RE: Narendra Guadalupe  [address]  [facility] [address]     Dear Colleague,    Thank you for referring your patient, Narendra Guadalupe, to the [facility] [place] at [facility]. Please see a copy of my visit note below.    Virtual Visit Details    Type of service:  Video Visit   Video Start Time:1645  Video End Time:4:56 PM  Non video work time 5 min  Total time 16 min  Originating Location (pt. Location): Home    Distant Location (provider location):  On-site  Platform used for Video Visit: [facility], Orthopaedic Oncology Surgery Consultation  by Abdirahman Adrian M.D.    Narendra Guadalupe MRN# [id number]    YOB: 1955     Requesting physician: MD [facility]  Tashia Dunn PAC [facility]  Lindsey Leo MD PCP            Assessment and Plan:   Assessment:  Stable appearance, cartilaginous tumor of left proximal tibia, ie enchondroma.  Large, yet no radiographic features to suggest chondrosarcoma.  Endosteal scalloping absent or minimally present on MR.  Unlikely to be related to prostate carcinoma.  Discussed nature of cartilage tumors with the patient.  Most likely this is solitary.  There is potential for malignant degeneration but it is rare.  Ensuring stability would be appropriate.     Plan:  Reviewed options of surgical excision vs surveillance.  Pt wishes to continue watchful waiting and active surveillance.  Clinic staff to contact pt to arrange return in 1 year with MRI L proximal tibia beforehand.    MD Shaniqua Gamble Family Professor  Oncology and Adult Reconstructive Surgery  Dept Orthopaedic Surgery, [facility]  [phone] office, [phone] pager  [email or web address]             History of Present Illness:   67 year old male  chief complaint    Pt seen virtually to follow up lesion L prox tibia.  Radiographic dx  enchondroma.  Presently asymptomatic.    Background history:  DX:  Bladder Cancer  Prostate Cancer    TREATMENTS:  11/21/2014 - Cystoscopy, Transurethral Resection of Bladder Tumor, Bilateral Retrograde Pyelograms and Ureteroscopy , Cystoscopy, With Retrograde Pyelogram - Bilateral Cystoureteroscopy - Bilateral, Rena Elizalde MD  12/30/2014 - Cystoscopy, Transurethral Resection Of Bladder Tumor, Rena Elizalde MD   4/28/2015 - Cystoscopy with Bladder Biopsy and Fulguration, Rena Elizalde MD   Prior history of tendon repair in the right knee.         Physical Exam:     EXAMINATION pertinent findings:   Deferred due to virtual visit.           Data:   All laboratory data reviewed  All imaging studies reviewed by me    MRI L tibia from 2022 compared to 10/2023 study..  stable appearance without any significant change over time.              Again, thank you for allowing me to participate in the care of your patient.      Sincerely,    Abdirahman Adrian MD

## 2024-02-01 ENCOUNTER — PRE VISIT (OUTPATIENT)
Dept: UROLOGY | Facility: CLINIC | Age: 69
End: 2024-02-01
Payer: MEDICARE

## 2024-02-01 DIAGNOSIS — C61 MALIGNANT NEOPLASM OF PROSTATE (H): Primary | ICD-10-CM

## 2024-02-01 NOTE — TELEPHONE ENCOUNTER
Reason for visit:   Cystoscopy     Relevant information:   Dx (H) malignant neoplasm of urinary bladder  LOV 2/13/23  POC PSA 6 mo PSA 1 yr  Records/imaging/labs/orders:       Pt called: Yes, left voicemail with patient instructing them to have a PSA lab completed prior to their appointment with Dr. Chase on the 12th.    At Rooming:   Eleonora Diaz LPN  2/1/2024  11:04 AM

## 2024-02-02 ENCOUNTER — LAB (OUTPATIENT)
Dept: LAB | Facility: CLINIC | Age: 69
End: 2024-02-02
Payer: MEDICARE

## 2024-02-02 DIAGNOSIS — C61 MALIGNANT NEOPLASM OF PROSTATE (H): ICD-10-CM

## 2024-02-02 LAB — PSA SERPL DL<=0.01 NG/ML-MCNC: 4.89 NG/ML (ref 0–4.5)

## 2024-02-02 PROCEDURE — 36415 COLL VENOUS BLD VENIPUNCTURE: CPT | Performed by: PATHOLOGY

## 2024-02-02 PROCEDURE — 84153 ASSAY OF PSA TOTAL: CPT | Performed by: PATHOLOGY

## 2024-02-02 RX ORDER — LIDOCAINE HYDROCHLORIDE 20 MG/ML
JELLY TOPICAL ONCE
Status: COMPLETED | OUTPATIENT
Start: 2024-02-12 | End: 2024-02-12

## 2024-02-12 ENCOUNTER — OFFICE VISIT (OUTPATIENT)
Dept: UROLOGY | Facility: CLINIC | Age: 69
End: 2024-02-12
Payer: MEDICARE

## 2024-02-12 VITALS — SYSTOLIC BLOOD PRESSURE: 137 MMHG | DIASTOLIC BLOOD PRESSURE: 87 MMHG | HEART RATE: 66 BPM

## 2024-02-12 DIAGNOSIS — C61 MALIGNANT NEOPLASM OF PROSTATE (H): Primary | ICD-10-CM

## 2024-02-12 DIAGNOSIS — C67.9 MALIGNANT NEOPLASM OF URINARY BLADDER, UNSPECIFIED SITE (H): ICD-10-CM

## 2024-02-12 PROCEDURE — 99207 PR NO CHARGE LOS: CPT | Performed by: UROLOGY

## 2024-02-12 PROCEDURE — 88112 CYTOPATH CELL ENHANCE TECH: CPT | Mod: TC | Performed by: UROLOGY

## 2024-02-12 PROCEDURE — 52000 CYSTOURETHROSCOPY: CPT | Performed by: UROLOGY

## 2024-02-12 PROCEDURE — 88112 CYTOPATH CELL ENHANCE TECH: CPT | Mod: 26 | Performed by: PATHOLOGY

## 2024-02-12 RX ADMIN — LIDOCAINE HYDROCHLORIDE: 20 JELLY TOPICAL at 08:16

## 2024-02-12 ASSESSMENT — PAIN SCALES - GENERAL: PAINLEVEL: NO PAIN (0)

## 2024-02-12 NOTE — PATIENT INSTRUCTIONS
Thank you for visiting with Dr. Chase today.  The following has been recommended for you after your appointment:   -Schedule with our lab in 6 months for another PSA   -Schedule another cystoscopy with Dr. Chase in 1 year.      Please reach out with questions.  [phone]    Graciela Diaz LPN

## 2024-02-12 NOTE — PROGRESS NOTES
CC: prostate and bladder cancer     HISTORY OF PRESENT ILLNESS:  Mr. Guadalupe is a 68-year-old gentleman followed in our clinic for history of Patricio 3+3 prostate cancer diagnosed in 2011 for which he is on active surveillance.  He was also diagnosed with high-grade Ta bladder cancer in 11/2014 for which he had a resection followed by BCG with no evidence of recurrence to date.  The patient's prostate volume has been measured to be 232 cc on an MRI on 08/29/2016 with more recent values measured at 156 in May 2018, and 190 ml on an MRI from 3/1/21.   He is on finasteride and flomax.  He recently had a surveillance biopsy on 10/18/21 showing no cancer.  He had a surveillance cysto showing area of white tissue which was biopsied and was negative for cancer.     OBJECTIVE:  PSA from 2/2/24 is 4.89 ng/mL (9.78 ng/mL adjusted for finasteride)  PSA from 12/7/22 is 5.44 (10.88 adjusted for finasteride) ng/mL     Cystoscopy: after informed consent was obtained, the patient was prepped and draped in standard sterile fashion. The flexible cystoscope was introduced into the patient's urethra without difficulty. There were no strictures in the urethra. Upon entering the bladder, the UOs were orthotopic and effluxing clear urine.  Again was noted the very large median lobe.  There were no other no mucosal lesions, stones or foreign objects noted in the bladder. The remainder of the exam was normal.     ASSESSMENT AND PLAN: Over half of today's 15-minute visit was spent reviewing the chart, results, and counseling the patient regarding his prostate and bladder cancers.  We are pleased to see the no cancer on the cysto.  Will repeat the cysto in 1 year.  Will check a PSA in 6 months and a surveillance cysto with another PSA in February 2025

## 2024-02-12 NOTE — NURSING NOTE
Chief Complaint   Patient presents with    Cystoscopy     Bladder cancer surveillance       Blood pressure 137/87, pulse 66. There is no height or weight on file to calculate BMI.    Patient Active Problem List   Diagnosis    Malignant neoplasm of prostate (H)    Bladder cancer (H)    Asthma    Fasting hyperglycemia    Obstructive sleep apnea syndrome    PLMD (periodic limb movement disorder)    Rosacea    Vitamin D deficiency    Malignant neoplasm of urinary bladder (H)    Prostate cancer (H)       No Known Allergies    Current Outpatient Medications   Medication Sig Dispense Refill    finasteride (PROSCAR) 5 MG tablet Take 1 tablet (5 mg) by mouth daily 90 tablet 3    tamsulosin (FLOMAX) 0.4 MG capsule Take 1 capsule (0.4 mg) by mouth daily 90 capsule 2    diazepam (VALIUM) 5 MG tablet Take tablet when MRI staff instructs you to do so at appointment. (Patient not taking: Reported on 2024) 1 tablet 0    finasteride (PROSCAR) 5 MG tablet Take 1 tablet (5 mg) by mouth daily for 360 days 90 tablet 3    tamsulosin (FLOMAX) 0.4 MG capsule Take 1 capsule (0.4 mg) by mouth daily for 360 days (Patient not taking: Reported on 2024) 90 capsule 3       Social History     Tobacco Use    Smoking status: Never    Smokeless tobacco: Never   Substance Use Topics    Alcohol use: Yes    Drug use: No       What to expect after the procedure reviewed with patient: Yes    Mel Castaneda CMA  2024  8:17 AM     Invasive Procedure Safety Checklist:    Procedure: Cystoscopy    Action: Complete sections and checkboxes as appropriate.    Pre-procedure:  1. Patient ID Verified with 2 identifiers ([place] and  or MRN) : YES    2. Procedure and site verified with patient/designee (when able) : YES    3. Accurate consent documentation in medical record : YES    4. H&P (or appropriate assessment) documented in medical record : YES  H&P must be up to 30 days prior to procedure an updated within 24 hours of                  Procedure as applicable.     5. Relevant diagnostic and radiology test results appropriately labeled and displayed as applicable : YES    6. Blood products, implants, devices, and/or special equipment available for the procedure as applicable : YES    7. Procedure site(s) marked with provider initials [Exclusions: None] : NO    8. Marking not required. Reason : Yes  Procedure does not require site marking    Time Out:     Time-Out performed immediately prior to starting procedure, including verbal and active participation of all team members addressing: YES    1. Correct patient identity.  2. Confirmed that the correct side and site are marked.  3. An accurate procedure to be done.  4. Agreement on the procedure to be done.  5. Correct patient position.  6. Relevant images and results are properly labeled and appropriately displayed.  7. The need to administer antibiotics or fluids for irrigation purposes during the procedure as applicable.  8. Safety precautions based on patient history or medication use.    During Procedure: Verification of correct person, site, and procedure occurs any time the responsibility for care of the patient is transferred to another member of the care team.      The following medication was given:     MEDICATION:  Uro-jet  ROUTE: Intra-urethral   SITE: Urethra  DOSE: 10 mL 2% lidocaine  LOT #: [id number]  : [employer]  EXPIRATION DATE: 09/25  NDC#: [address]-70   Was there drug waste? No    Prior to administration, verified patient identity using patient's name and date of birth.  Due to administration, patient instructed to remain in clinic for 15 minutes  afterwards, and to report any adverse reaction to me immediately.      Drug Amount Wasted:  None.  Vial/Syringe: Single dose vial    Mel Castaneda CMA  February 12, 2024

## 2024-02-12 NOTE — LETTER
2/12/2024       RE: Narendra Guadalupe  [address] [facility] [address]     Dear Colleague,    Thank you for referring your patient, Narendra Guadalupe, to the [facility]OLOGY CLIN[facility] [place] at [facility]. Please see a copy of my visit note below.    CC: prostate and bladder cancer     HISTORY OF PRESENT ILLNESS:  Mr. Guadalupe is a 68-year-old gentleman followed in our clinic for history of Patricio 3+3 prostate cancer diagnosed in 2011 for which he is on active surveillance.  He was also diagnosed with high-grade Ta bladder cancer in 11/2014 for which he had a resection followed by BCG with no evidence of recurrence to date.  The patient's prostate volume has been measured to be 232 cc on an MRI on 08/29/2016 with more recent values measured at 156 in May 2018, and 190 ml on an MRI from 3/1/21.   He is on finasteride and flomax.  He recently had a surveillance biopsy on 10/18/21 showing no cancer.  He had a surveillance cysto showing area of white tissue which was biopsied and was negative for cancer.     OBJECTIVE:  PSA from 2/2/24 is 4.89 ng/mL (9.78 ng/mL adjusted for finasteride)  PSA from 12/7/22 is 5.44 (10.88 adjusted for finasteride) ng/mL     Cystoscopy: after informed consent was obtained, the patient was prepped and draped in standard sterile fashion. The flexible cystoscope was introduced into the patient's urethra without difficulty. There were no strictures in the urethra. Upon entering the bladder, the UOs were orthotopic and effluxing clear urine.  Again was noted the very large median lobe.  There were no other no mucosal lesions, stones or foreign objects noted in the bladder. The remainder of the exam was normal.     ASSESSMENT AND PLAN: Over half of today's 15-minute visit was spent reviewing the chart, results, and counseling the patient regarding his prostate and bladder cancers.  We are pleased to see the no cancer on the  cysto.  Will repeat the cysto in 1 year.  Will check a PSA in 6 months and a surveillance cysto with another PSA in February 2025    Taz Chase MD

## 2024-02-18 ENCOUNTER — MYC MEDICAL ADVICE (OUTPATIENT)
Dept: UROLOGY | Facility: CLINIC | Age: 69
End: 2024-02-18
Payer: MEDICARE

## 2024-02-18 DIAGNOSIS — R39.15 URGENCY OF URINATION: ICD-10-CM

## 2024-02-18 DIAGNOSIS — R31.9 HEMATURIA, UNSPECIFIED TYPE: Primary | ICD-10-CM

## 2024-02-19 ENCOUNTER — LAB (OUTPATIENT)
Dept: LAB | Facility: CLINIC | Age: 69
End: 2024-02-19
Payer: MEDICARE

## 2024-02-19 DIAGNOSIS — R31.9 HEMATURIA, UNSPECIFIED TYPE: ICD-10-CM

## 2024-02-19 LAB
ALBUMIN UR-MCNC: 20 MG/DL
APPEARANCE UR: ABNORMAL
BILIRUB UR QL STRIP: NEGATIVE
COLOR UR AUTO: YELLOW
GLUCOSE UR STRIP-MCNC: NEGATIVE MG/DL
HGB UR QL STRIP: ABNORMAL
KETONES UR STRIP-MCNC: NEGATIVE MG/DL
LEUKOCYTE ESTERASE UR QL STRIP: ABNORMAL
MUCOUS THREADS #/AREA URNS LPF: PRESENT /LPF
NITRATE UR QL: NEGATIVE
PH UR STRIP: 5.5 [PH] (ref 5–7)
RBC URINE: 177 /HPF
SP GR UR STRIP: 1.03 (ref 1–1.03)
SQUAMOUS EPITHELIAL: <1 /HPF
TRANSITIONAL EPI: 1 /HPF
UROBILINOGEN UR STRIP-MCNC: NORMAL MG/DL
WBC URINE: >182 /HPF

## 2024-02-19 PROCEDURE — 81001 URINALYSIS AUTO W/SCOPE: CPT | Performed by: PATHOLOGY

## 2024-02-19 PROCEDURE — 88112 CYTOPATH CELL ENHANCE TECH: CPT | Mod: TC | Performed by: UROLOGY

## 2024-02-19 PROCEDURE — 99000 SPECIMEN HANDLING OFFICE-LAB: CPT | Performed by: PATHOLOGY

## 2024-02-19 PROCEDURE — 88112 CYTOPATH CELL ENHANCE TECH: CPT | Mod: 26 | Performed by: PATHOLOGY

## 2024-02-19 PROCEDURE — 87086 URINE CULTURE/COLONY COUNT: CPT | Performed by: UROLOGY

## 2024-02-23 RX ORDER — LEVOFLOXACIN 750 MG/1
750 TABLET, FILM COATED ORAL DAILY
Qty: 5 TABLET | Refills: 0 | Status: SHIPPED | OUTPATIENT
Start: 2024-02-23 | End: 2024-02-28

## 2024-02-23 NOTE — PROGRESS NOTES
Patient UA positive, spoke to lab requested sensitivities be ran. In the meantime, reviewed previous ua positive with same bacteria, previous sensitivities showed levaquin susceptibility, writer ordered this abx and notified patient,    Thank you,  CIARRA TerrellN RN-Triage-Urology

## 2024-02-25 LAB — BACTERIA UR CULT: ABNORMAL

## 2024-06-22 ENCOUNTER — HEALTH MAINTENANCE LETTER (OUTPATIENT)
Age: 69
End: 2024-06-22

## 2024-08-26 DIAGNOSIS — C61 MALIGNANT NEOPLASM OF PROSTATE (H): ICD-10-CM

## 2024-08-26 DIAGNOSIS — N40.0 BPH (BENIGN PROSTATIC HYPERPLASIA): ICD-10-CM

## 2024-08-29 RX ORDER — FINASTERIDE 5 MG/1
TABLET, FILM COATED ORAL
Qty: 90 TABLET | Refills: 1 | Status: SHIPPED | OUTPATIENT
Start: 2024-08-29

## 2024-08-29 RX ORDER — TAMSULOSIN HYDROCHLORIDE 0.4 MG/1
CAPSULE ORAL
Qty: 90 CAPSULE | Refills: 1 | Status: SHIPPED | OUTPATIENT
Start: 2024-08-29

## 2024-08-29 NOTE — TELEPHONE ENCOUNTER
LVD:  2/12/2024  [facility] [place]     Taz Chase MD  Urology   RTC 2/10/25   Refilled per protocol.

## 2024-10-04 ENCOUNTER — TELEPHONE (OUTPATIENT)
Dept: ORTHOPEDICS | Facility: CLINIC | Age: 69
End: 2024-10-04
Payer: MEDICARE

## 2024-10-04 DIAGNOSIS — D16.9 ENCHONDROMA OF BONE: Primary | ICD-10-CM

## 2024-10-04 NOTE — TELEPHONE ENCOUNTER
Spoke with patient to schedule Return [facility] Tumor appt with Dr. Adrian and same day MRI. Patient declined to schedule and said it was unnecessary.

## 2025-01-22 ENCOUNTER — LAB (OUTPATIENT)
Dept: LAB | Facility: CLINIC | Age: 70
End: 2025-01-22
Payer: COMMERCIAL

## 2025-01-22 DIAGNOSIS — C61 MALIGNANT NEOPLASM OF PROSTATE (H): ICD-10-CM

## 2025-01-22 LAB — PSA SERPL DL<=0.01 NG/ML-MCNC: 5.03 NG/ML (ref 0–6.5)

## 2025-01-22 PROCEDURE — 36415 COLL VENOUS BLD VENIPUNCTURE: CPT | Performed by: PATHOLOGY

## 2025-01-22 PROCEDURE — 84153 ASSAY OF PSA TOTAL: CPT | Performed by: PATHOLOGY

## 2025-01-22 NOTE — TELEPHONE ENCOUNTER
Reason for visit: Cystoscopy      Relevant information: Last seen by Dr. Chase on 2/12/24. Plan of care to follow up with surveillance cystoscopy and PSA labs in one year.      Records/imaging/labs/orders: IN [facility], CARE EVERYWHERE, AND PACS   PSA labs ordered, not yet scheduled.      At rooming:    Standard scope set-up  Collect voided urine sample prior to procedure   Order lidocaine (Urojet) during the pre-visit   have a yellow gown available in the room for Dr. Chase  have size 7.5 sterile gloves available  record scope info in Flowsheets in Epic      Bebe Quinones on 1/22/2025 at 10:45 AM

## 2025-01-23 RX ORDER — LIDOCAINE HYDROCHLORIDE 20 MG/ML
JELLY TOPICAL ONCE
OUTPATIENT
Start: 2025-02-10

## 2025-02-10 ENCOUNTER — PRE VISIT (OUTPATIENT)
Dept: UROLOGY | Facility: CLINIC | Age: 70
End: 2025-02-10

## 2025-02-10 ENCOUNTER — OFFICE VISIT (OUTPATIENT)
Dept: UROLOGY | Facility: CLINIC | Age: 70
End: 2025-02-10
Payer: COMMERCIAL

## 2025-02-10 VITALS — DIASTOLIC BLOOD PRESSURE: 78 MMHG | HEART RATE: 59 BPM | OXYGEN SATURATION: 99 % | SYSTOLIC BLOOD PRESSURE: 134 MMHG

## 2025-02-10 DIAGNOSIS — C67.9 MALIGNANT NEOPLASM OF URINARY BLADDER, UNSPECIFIED SITE (H): Primary | ICD-10-CM

## 2025-02-10 DIAGNOSIS — C61 MALIGNANT NEOPLASM OF PROSTATE (H): Primary | ICD-10-CM

## 2025-02-10 DIAGNOSIS — N40.1 BENIGN PROSTATIC HYPERPLASIA WITH LOWER URINARY TRACT SYMPTOMS, SYMPTOM DETAILS UNSPECIFIED: ICD-10-CM

## 2025-02-10 DIAGNOSIS — C67.9 MALIGNANT NEOPLASM OF URINARY BLADDER, UNSPECIFIED SITE (H): ICD-10-CM

## 2025-02-10 PROCEDURE — 88112 CYTOPATH CELL ENHANCE TECH: CPT | Mod: 26 | Performed by: PATHOLOGY

## 2025-02-10 PROCEDURE — 88112 CYTOPATH CELL ENHANCE TECH: CPT | Mod: TC | Performed by: UROLOGY

## 2025-02-10 PROCEDURE — 52000 CYSTOURETHROSCOPY: CPT | Performed by: UROLOGY

## 2025-02-10 RX ADMIN — LIDOCAINE HYDROCHLORIDE: 20 JELLY TOPICAL at 08:47

## 2025-02-10 ASSESSMENT — PAIN SCALES - GENERAL: PAINLEVEL_OUTOF10: NO PAIN (0)

## 2025-02-10 NOTE — LETTER
2/10/2025       RE: Narendra Guadalupe  [address] [facility] [address]     Dear Colleague,    Thank you for referring your patient, Narendra Guadalupe, to the [facility] UROLOGY CLINIC [place] at [facility]. Please see a copy of my visit note below.    CC: prostate and bladder cancer     HISTORY OF PRESENT ILLNESS:  Mr. Guadalupe is a 69-year-old gentleman followed in our clinic for history of Patricio 3+3 prostate cancer diagnosed in 2011 for which he is on active surveillance.  He was also diagnosed with high-grade Ta bladder cancer in 11/2014 for which he had a resection followed by BCG with no evidence of recurrence to date.  The patient's prostate volume has been measured to be 232 cc on an MRI on 08/29/2016 with more recent values measured at 156 in May 2018, and 190 ml on an MRI from 3/1/21.   He is on finasteride and flomax.  He recently had a surveillance biopsy on 10/18/21 showing no cancer.  He had a surveillance cysto showing area of white tissue which was biopsied and was negative for cancer.  He notes he is able to sleep through the night now whereas previously he had not been able.     OBJECTIVE:  PSA 1/22/25 is 5.03 ng/mL (10.06 ng/mL adjusted for finasteride)  PSA from 2/2/24 is 4.89 ng/mL (9.78 ng/mL adjusted for finasteride)  PSA from 12/7/22 is 5.44 (10.88 adjusted for finasteride) ng/mL     Cystoscopy: after informed consent was obtained, the patient was prepped and draped in standard sterile fashion. The flexible cystoscope was introduced into the patient's urethra without difficulty. There were no strictures in the urethra. Upon entering the bladder, the UOs were orthotopic and effluxing clear urine.  Again was noted the very large median lobe.  There were no other no mucosal lesions, stones or foreign objects noted in the bladder. The remainder of the exam was normal.     ASSESSMENT AND PLAN: 23-minutes were spent today  reviewing the chart, interpreting results, and counseling the patient regarding his prostate and bladder cancers.  We are pleased to see the no cancer on the cysto.  Will repeat the cysto in 1 year.  Will check a PSA in 12 months.  We will also get a CT urogram at his convenience.      Again, thank you for allowing me to participate in the care of your patient.      Sincerely,    Taz Chase MD

## 2025-02-10 NOTE — NURSING NOTE
Chief Complaint   Patient presents with    Cystoscopy     Here today for a cystoscopy. He denies pain, urinary tract infection symptoms and recent infections.      Pre and post procedure education was provided to the patient. The patient verbalized understanding.  Patient declined printed AVS and patient instructions today.       Blood pressure 134/78, pulse 59, SpO2 99%. There is no height or weight on file to calculate BMI.    Patient Active Problem List   Diagnosis    Malignant neoplasm of prostate (H)    Bladder cancer (H)    Asthma    Fasting hyperglycemia    Obstructive sleep apnea syndrome    PLMD (periodic limb movement disorder)    Rosacea    Vitamin D deficiency    Malignant neoplasm of urinary bladder (H)    Prostate cancer (H)       No Known Allergies    Current Outpatient Medications   Medication Sig Dispense Refill    diazepam (VALIUM) 5 MG tablet Take tablet when MRI staff instructs you to do so at appointment. 1 tablet 0    finasteride (PROSCAR) 5 MG tablet TAKE 1 TABLET (5MG) BY MOUTH ONCE DAILY 90 tablet 1    tamsulosin (FLOMAX) 0.4 MG capsule TAKE 1 CAPSULE (0.4MG) BY MOUTH ONCE DAILY 90 capsule 1       Social History     Tobacco Use    Smoking status: Never    Smokeless tobacco: Never   Substance Use Topics    Alcohol use: Yes    Drug use: No       Invasive Procedure Safety Checklist:    Procedure: Cystoscopy    Action: Complete sections and checkboxes as appropriate.    Pre-procedure:  1. Patient ID Verified with 2 identifiers (Minal and  or MRN) : YES    2. Procedure and site verified with patient/designee (when able) : YES    3. Accurate consent documentation in medical record : YES    4. H&P (or appropriate assessment) documented in medical record : N/A  H&P must be up to 30 days prior to procedure an updated within 24 hours of                 Procedure as applicable.     5. Relevant diagnostic and radiology test results appropriately labeled and displayed as applicable : YES    6. Blood  products, implants, devices, and/or special equipment available for the procedure as applicable : YES    7. Procedure site(s) marked with provider initials [Exclusions: none] : NO    8. Marking not required. Reason : Yes  Procedure does not require site marking    Time Out:     Time-Out performed immediately prior to starting procedure, including verbal and active participation of all team members addressing: YES    1. Correct patient identity.  2. Confirmed that the correct side and site are marked.  3. An accurate procedure to be done.  4. Agreement on the procedure to be done.  5. Correct patient position.  6. Relevant images and results are properly labeled and appropriately displayed.  7. The need to administer antibiotics or fluids for irrigation purposes during the procedure as applicable.  8. Safety precautions based on patient history or medication use.    During Procedure: Verification of correct person, site, and procedure occurs any time the responsibility for care of the patient is transferred to another member of the care team.    The following medication was given:     MEDICATION:  Lidocaine without epinephrine 2% jelly  ROUTE: urethral   SITE: urethral   DOSE: 10 mL  LOT #: [id number]  : [employer]  EXPIRATION DATE: 8-26  NDC#: [address]-9   Was there drug waste? No    Prior to med admin, verified patient identity using patient's name and date of birth.  Due to med administration, patient instructed to remain in clinic for 15 minutes  afterwards, and to report any adverse reaction to me immediately.    Drug Amount Wasted:  None.  Vial/Syringe: Syringe      Bebe Quinones  2/10/2025  8:09 AM

## 2025-02-10 NOTE — PATIENT INSTRUCTIONS
"AFTER YOUR CYSTOSCOPY        You have just completed a cystoscopy, or \"cysto\", which allowed your physician to learn more about your bladder (or to remove a stent placed after surgery). We suggest that you continue to avoid caffeine, fruit juice, and alcohol for the next 24 hours, however, you are encouraged to return to your normal activities.         A few things that are considered normal after your cystoscopy:     * Small amount of bleeding (or spotting) that clears within the next 24 hours     * Slight burning sensation with urination     * Sensation to of needing to avoid more frequently     * The feeling of \"air\" in your urine     * Mild discomfort that is relieved with Tylenol        Please contact our office promptly if you:     * Develop a fever above 101 degrees     * Are unable to urinate     * Develop bright red blood that does not stop     * Severe pain or swelling         Please contact our office with any concerns or questions @ [phone]   "

## 2025-02-10 NOTE — PROGRESS NOTES
CC: prostate and bladder cancer     HISTORY OF PRESENT ILLNESS:  Mr. Guadalupe is a 69-year-old gentleman followed in our clinic for history of Patricio 3+3 prostate cancer diagnosed in 2011 for which he is on active surveillance.  He was also diagnosed with high-grade Ta bladder cancer in 11/2014 for which he had a resection followed by BCG with no evidence of recurrence to date.  The patient's prostate volume has been measured to be 232 cc on an MRI on 08/29/2016 with more recent values measured at 156 in May 2018, and 190 ml on an MRI from 3/1/21.   He is on finasteride and flomax.  He recently had a surveillance biopsy on 10/18/21 showing no cancer.  He had a surveillance cysto showing area of white tissue which was biopsied and was negative for cancer.  He notes he is able to sleep through the night now whereas previously he had not been able.     OBJECTIVE:  PSA 1/22/25 is 5.03 ng/mL (10.06 ng/mL adjusted for finasteride)  PSA from 2/2/24 is 4.89 ng/mL (9.78 ng/mL adjusted for finasteride)  PSA from 12/7/22 is 5.44 (10.88 adjusted for finasteride) ng/mL     Cystoscopy: after informed consent was obtained, the patient was prepped and draped in standard sterile fashion. The flexible cystoscope was introduced into the patient's urethra without difficulty. There were no strictures in the urethra. Upon entering the bladder, the UOs were orthotopic and effluxing clear urine.  Again was noted the very large median lobe.  There were no other no mucosal lesions, stones or foreign objects noted in the bladder. The remainder of the exam was normal.     ASSESSMENT AND PLAN: 23-minutes were spent today reviewing the chart, interpreting results, and counseling the patient regarding his prostate and bladder cancers.  We are pleased to see the no cancer on the cysto.  Will repeat the cysto in 1 year.  Will check a PSA in 12 months.  We will also get a CT urogram at his convenience.

## 2025-02-17 ENCOUNTER — LAB (OUTPATIENT)
Dept: LAB | Facility: CLINIC | Age: 70
End: 2025-02-17
Attending: UROLOGY
Payer: COMMERCIAL

## 2025-02-17 DIAGNOSIS — R39.15 URGENCY OF URINATION: Primary | ICD-10-CM

## 2025-02-17 DIAGNOSIS — R39.9 SYMPTOMS OF URINARY TRACT INFECTION: Primary | ICD-10-CM

## 2025-02-17 DIAGNOSIS — R39.9 URINARY TRACT INFECTION SYMPTOMS: ICD-10-CM

## 2025-02-17 LAB
ALBUMIN UR-MCNC: 50 MG/DL
APPEARANCE UR: ABNORMAL
BILIRUB UR QL STRIP: NEGATIVE
COLOR UR AUTO: YELLOW
GLUCOSE UR STRIP-MCNC: NEGATIVE MG/DL
HGB UR QL STRIP: ABNORMAL
KETONES UR STRIP-MCNC: NEGATIVE MG/DL
LEUKOCYTE ESTERASE UR QL STRIP: ABNORMAL
MUCOUS THREADS #/AREA URNS LPF: PRESENT /LPF
NITRATE UR QL: NEGATIVE
PH UR STRIP: 6 [PH] (ref 5–7)
RBC URINE: >182 /HPF
SP GR UR STRIP: 1.02 (ref 1–1.03)
UROBILINOGEN UR STRIP-MCNC: NORMAL MG/DL
WBC URINE: >182 /HPF

## 2025-02-17 PROCEDURE — 99000 SPECIMEN HANDLING OFFICE-LAB: CPT | Performed by: PATHOLOGY

## 2025-02-17 PROCEDURE — 87186 SC STD MICRODIL/AGAR DIL: CPT | Performed by: UROLOGY

## 2025-02-17 PROCEDURE — 81001 URINALYSIS AUTO W/SCOPE: CPT | Performed by: PATHOLOGY

## 2025-02-17 RX ORDER — LEVOFLOXACIN 750 MG/1
750 TABLET, FILM COATED ORAL DAILY
Qty: 5 TABLET | Refills: 0 | Status: SHIPPED | OUTPATIENT
Start: 2025-02-17 | End: 2025-02-22

## 2025-02-17 NOTE — PROGRESS NOTES
Call placed to Zohaib. He had a cysto with Dr. Chase on 2/10/25 and developed sx of burning pain with voiding, increased urinary frequency, and chills at the end of last week around 2/14. He denies any fever, weakness, fatigue, or SOB. He states he is able to empty his bladder, but has noticed a weaker stream.     Advised Zohaib to have UA/UC. Told him orders will be placed to [facility] labs and he can call to [phone] to collect this lab today. Will await results and treat appropriately. Pt V/U.       Mike TRAN RN  Urology Triage

## 2025-02-17 NOTE — PROGRESS NOTES
UA/UC sample was provided today d/t UTI symptoms reported in another pt encounter. UA reviewed showing ALISIA+, RBC, cloudy. Notified pt that an abx levaquin 750 x5 days will be sent to his local pharmacy per UTI protocol. Will await final UC to ensure pt has appropriate treatment. Pt V/U.       Mike TRAN RN  Urology Triage

## 2025-02-19 LAB
BACTERIA UR CULT: ABNORMAL
BACTERIA UR CULT: ABNORMAL

## 2025-02-20 DIAGNOSIS — N39.0 URINARY TRACT INFECTION: Primary | ICD-10-CM

## 2025-02-20 RX ORDER — AMOXICILLIN AND CLAVULANATE POTASSIUM 500; 125 MG/1; MG/1
1 TABLET, FILM COATED ORAL 2 TIMES DAILY
Qty: 14 TABLET | Refills: 0 | Status: SHIPPED | OUTPATIENT
Start: 2025-02-20 | End: 2025-02-27

## 2025-02-20 NOTE — PROGRESS NOTES
Per Kamilla Ohara DNP, to treat UC from 2/17/25 collection with Aumentin 500/125mg BID for 7 days. LVM to pt regarding this medication for treatment.      Mike TRAN RN  Urology Triage

## 2025-02-22 ENCOUNTER — ANCILLARY PROCEDURE (OUTPATIENT)
Dept: CT IMAGING | Facility: CLINIC | Age: 70
End: 2025-02-22
Attending: UROLOGY
Payer: COMMERCIAL

## 2025-02-22 DIAGNOSIS — C67.9 MALIGNANT NEOPLASM OF URINARY BLADDER, UNSPECIFIED SITE (H): ICD-10-CM

## 2025-02-22 LAB
CREAT BLD-MCNC: 0.9 MG/DL (ref 0.7–1.3)
EGFRCR SERPLBLD CKD-EPI 2021: >60 ML/MIN/1.73M2

## 2025-02-22 PROCEDURE — 74178 CT ABD&PLV WO CNTR FLWD CNTR: CPT | Performed by: RADIOLOGY

## 2025-02-22 PROCEDURE — 82565 ASSAY OF CREATININE: CPT | Performed by: PATHOLOGY

## 2025-02-22 RX ORDER — IOPAMIDOL 755 MG/ML
95 INJECTION, SOLUTION INTRAVASCULAR ONCE
Status: COMPLETED | OUTPATIENT
Start: 2025-02-22 | End: 2025-02-22

## 2025-02-22 RX ADMIN — IOPAMIDOL 95 ML: 755 INJECTION, SOLUTION INTRAVASCULAR at 07:57

## 2025-02-22 NOTE — DISCHARGE INSTRUCTIONS

## 2025-03-05 ENCOUNTER — TRANSCRIBE ORDERS (OUTPATIENT)
Dept: OTHER | Age: 70
End: 2025-03-05

## 2025-03-05 DIAGNOSIS — K63.9 BOWEL WALL THICKENING: Primary | ICD-10-CM

## 2025-03-10 ENCOUNTER — MYC MEDICAL ADVICE (OUTPATIENT)
Dept: GASTROENTEROLOGY | Facility: CLINIC | Age: 70
End: 2025-03-10
Payer: MEDICARE

## 2025-03-10 ENCOUNTER — TELEPHONE (OUTPATIENT)
Dept: GASTROENTEROLOGY | Facility: CLINIC | Age: 70
End: 2025-03-10
Payer: MEDICARE

## 2025-03-10 NOTE — TELEPHONE ENCOUNTER
[facility] Call Center    Phone Message    May a detailed message be left on voicemail: Yes    Reason for Call: Other: Patient is currently scheduled on 3-19-25, as visit type New GI Emergent . This is outside the expected timeline for this referral. Patient has been added to the waitlist.      Action Taken: Message routed to:  Other: GI REFERRAL TRIAGE POOL     Travel Screening: Not Applicable

## 2025-03-10 NOTE — TELEPHONE ENCOUNTER
The appointment is now scheduled within the appropriate time frame of two weeks for emergent triaged referrals. No rescheduling is needed anymore. Closing encounter.

## 2025-03-10 NOTE — PROGRESS NOTES
Narednra Guadalupe is a 70 year old patient who is being evaluated via a billable virtual visit.       How would you like to obtain your AVS? MyChart  If the video visit is dropped, the invitation should be resent by: Text to cell phone: [phone]  Will anyone else be joining your video visit? No    Video-Visit Details     Type of service:  Video Visit     Video Start Time (time video started): 1258     Video End Time (time video stopped): 1307     Physician has received verbal consent for a Video Visit from the patient? Yes     Originating Location (pt. Location): Home    Distant Location (provider location):  Off-site    Platform used for Video Visit: [address] [address]  Phone: [phone]    Patient:  Narendra Guadalupe, Date of birth 1955  Date of Visit:  3/19/25     GASTROENTEROLOGY NEW PATIENT VIDEO VISIT    CC/REFERRING MD:    Lindsey Leo    REASON FOR CONSULTATION:   Referred for Consult (Bowel wall thickening)      HISTORY OF PRESENT ILLNESS:  Narendra Guadalupe is a 70 year old male who was referred to GI clinic for a consultation on abnormal CT finding of thickening of cecal wall. Hx of prostate cancer diagnosed in 2011 for which he is on active surveillance. No recurrence.  He was also diagnosed with high-grade Ta bladder cancer in 11/2014 for which he had a resection followed by BCG with no evidence of recurrence to date.     Patient denies any history of gastrointestinal diseases except of an episode of Giardia infection in 2017 after traveling to [place].  Was treated and his symptoms had resolved.  Patient denies any abdominal pain, constipation, diarrhea, weight loss, changes in appetite, nausea, vomiting, hematochezia, or melena.  No signs of malabsorption.  No history of abdominal surgeries.      PREVIOUS ENDOSCOPY:  1/11/2018 Colonoscopy at [facility]  Sigmoid diverticulosis      PERTINENT IMAGING STUDIES WERE  REVIEWED IN EMR  2/22/2025 CT abdomen/pelvis  FINDINGS: Dependent atelectasis in the bases. Small hiatal hernia containing peritoneal fat. Multiple cysts scattered throughout the  liver. Cholelithiasis without evidence of cholecystitis. Pancreas,spleen, adrenal glands, are unremarkable. Tiny cyst in the lower pole  right kidney. Para-aortic lymph nodes are noted, borderline in size.  Prostate enlargement. No dilated bowel loops. Mild cecal wall thickening, nonspecific. Significant mass effect on the inferior  aspect of the bladder with mild right-sided bladder wall thickening.     Bones: Degenerative changes of the spine.                                                               IMPRESSION:  1. Cecal wall thickening possibly neoplastic or inflammatory in  origin. Consider endoscopic evaluation, CT or MR  enterography/colonography or PET/CT.  2. Borderline periaortic lymph nodes, indeterminate.  3. Marked prostate enlargement with mild right sided bladder wall  thickening.    HISTORY:   has a past medical history of Fasting hyperglycemia, Obstructive sleep apnea syndrome (8/17/2010), Prostate cancer (H), Prostate cancer (H), Uncomplicated asthma, and Vitamin D deficiency.     has a past surgical history that includes colonoscopy; hernia repair; knee surgery; Surgical Pathology Exam; Cystoscopy, transurethral resection (TUR) tumor bladder, combined (N/A, 11/21/2014); Cystoscopy, retrogrades, combined (Bilateral, 11/21/2014); Cystoscopy, ureteroscopy, combined (Bilateral, 11/21/2014); Cystoscopy, transurethral resection (TUR) tumor bladder, combined (N/A, 12/30/2014); and Cystoscopy, fulgurate bleeders, evacuate clot(s), combined (N/A, 4/28/2015).     reports that he has never smoked. He has never used smokeless tobacco. He reports current alcohol use. He reports that he does not use drugs.    family history is not on file.    ALLERGIES:   No Known Allergies    PERTINENT MEDICATIONS:    Current Outpatient  "Medications:     diazepam (VALIUM) 5 MG tablet, Take tablet when MRI staff instructs you to do so at appointment., Disp: 1 tablet, Rfl: 0    finasteride (PROSCAR) 5 MG tablet, TAKE 1 TABLET (5MG) BY MOUTH ONCE DAILY, Disp: 90 tablet, Rfl: 1    tamsulosin (FLOMAX) 0.4 MG capsule, TAKE 1 CAPSULE (0.4MG) BY MOUTH ONCE DAILY, Disp: 90 capsule, Rfl: 1      ROS: 10pt ROS performed and otherwise negative.    PHYSICAL EXAMINATION:  Wt:   Wt Readings from Last 2 Encounters:   10/26/23 87.5 kg (193 lb)   02/13/23 89.8 kg (198 lb)      Physical Exam  Vitals reviewed: There were no vitals taken for this visit.   Constitutional: aaox3, cooperative, pleasant, not dyspneic/diaphoretic, no acute distress  Eyes: Sclera anicteric/injected  Respiratory: Unlabored breathing, speaking in full sentences.   Psych: Normal affect, speech is clear and appropriate.Neatly groomed    RECENT LABS:   No lab results found.  No lab results found.    Invalid input(s): \"ROSEMARIE\", \"ALP\"  Recent Labs   Lab Test 02/22/25  0751   CR 0.9     No results found for: \"TSH\"      ASSESSMENT/PLAN:    ICD-10-CM    1. Malignant neoplasm of prostate (H)  C61       2. Bowel wall thickening  K63.9 Adult GI  Referral - Consult Only     MR Enterography wo and w Contrast      3. Claustrophobia  F40.240 diazepam (VALIUM) 5 MG tablet     DISCONTINUED: diazepam (VALIUM) 5 MG tablet      4. Asymptomatic cholelithiasis  K80.20            Narendra Guadalupe is a 70 year old male who presents to GI clinic for a consultation of abnormal findings on CT scan, that showed mild nonspecific thickening of the cecal wall.  CT scan was ordered by patient's oncology as a surveillance as he has history of prostate and bladder cancer.  Incidental finding of possible cecal inflammation or malignancy per radiology.  Suggested to consider endoscopic evaluation, CT or MR enterography/colonography or PET/CT.   Patient is asymptomatic.  No history of bowel surgery or any GI problems " except Giardia infection in 2017 which was treated.  No complications. Noted that patient has history of diverticulosis.  Had unremarkable colonoscopy in 2018, plan to have a routine surveillance colonoscopy in 2028.  I explained to the patient that there could be technical difficulties to capture exact thickness of the intestinal or colonic walls by CT scan due to continuous peristalsis.  At times, abnormal image could be related to underdistention of the wall.  Given his history of prostate and bladder malignancy, will order MR enterography.  No surveillance of GI tract would be necessary if MR comes back unremarkable.  If abnormal study, we will proceed with PET CT and colonoscopy.  Patient requested to have Valium prescription due to claustrophobia.  Will honor the request.  I noticed incidental finding of cholelithiasis.  Patient is asymptomatic.  Will not proceed with any further evaluation at this time.    Patient verbalized understanding and appreciation of care provided. Stated that all of the questions were answered to her/his satisfaction.  Follow up as needed.  Will send the patient [employer] message with MRI findings and changes to plan of care if needed.  This note was created with Dragon voice recognition software. Inadvertent minor typographic errors may occur in transcription. Feel free to contact the provider if you have any questions.  I sincerely appreciate an opportunity to provide consultation for this pleasant patient.    AKUA Mclain  [facility],  Gastroenterology,  [place], [place]

## 2025-03-18 NOTE — PATIENT INSTRUCTIONS
It was a pleasure taking care of you today.  I've included a brief summary of our discussion and care plan from today's visit below.  Please review this information with your primary care provider.  ______________________________________________________________________    My recommendations are summarized as follows:    As we discussed today, there was a questionable minimal wall thickening of the cecum (part of the small intestine).  I will order MR enterography for further evaluation.  Radiology department will contact you to schedule the test.    2.  I placed a prescription for Valium to take before MRI for claustrophobia    Return to GI Clinic in 4 weeks if your imaging study comes back abnormal.    ______________________________________________________________________    ____________________________________________________________________  Please see below for any additional questions and scheduling guidelines.    Sign up for Advisity: Advisity patient portal serves as a secure platform for accessing your medical records from the [facility]. Additionally, Advisity facilitates easy, timely, and secure messaging with your care team. If you have not signed up, you may do so by using the provided code or calling [phone].    Coordinating your care after your visit:  There are multiple options for scheduling your follow-up care based on your provider's recommendation.    How do I schedule a follow-up clinic appointment:   After your appointment, you may receive scheduling assistance with the Clinic Coordinators by having a seat in the waiting room and a Clinic Coordinator will call you up to schedule.  Virtual visits or after you leave the clinic:  Your provider has placed a follow-up order in the [employer] portal for scheduling your return appointment. A member of the scheduling team will contact you to schedule.  [employer] Designed Scheduling: Timely scheduling through [employer]ity is advised to ensure appointment  availability.   Call to schedule: You may schedule your follow-up appointment(s) by calling [phone], option 1.    How do I schedule my endoscopy or colonoscopy procedure:  If a procedure, such as a colonoscopy or upper endoscopy was ordered by your provider, the scheduling team will contact you to schedule this procedure. Or you may choose to call to schedule at   [phone], option 2.  Please allow 20-30 minutes when scheduling a procedure.    How do I get my blood work done? To get your blood work done, you need to schedule a lab appointment at an [facility]. There are multiple ways to schedule:   At the clinic: The Clinic Coordinator you meet after your visit can help you schedule a lab appointment.   Spoke scheduling: Spoke offers online lab scheduling at all [facility] laboratory locations.   Call to schedule: You can call [phone] to schedule your lab appointment.    How do I schedule my imaging study: To schedule imaging studies, such as CT scans, ultrasounds, MRIs, or X-rays, contact Imaging Services at [phone].    How do I schedule a referral to another doctor: If your provider recommended a referral to another specialist(s), the referral order was placed by your provider. You will receive a phone call to schedule this referral, or you may choose to call the number attached to the referral to self-schedule.    For Post-Visit Question(s):  For any inquiries following today's visit:  Please utilize Spoke messaging and allow 48 hours for reply or contact the Call Center during normal business hours at [phone], option 3.  For Emergent After-hours questions, contact the On-Call GI Fellow through the [facility]  at [phone].  In addition, you may contact your Nurse directly using the provided contact information.    Test Results:  Test results will be accessible via Spoke in compliance with the 21st Century Cures Act. This means that  your results will be available to you at the same time as your provider. Often you may see your results before your provider does. Results are reviewed by staff within two weeks with communication follow-up. Results may be released in the patient portal prior to your care team review.    Prescription Refill(s):  Medication prescribed by your provider will be addressed during your visit. For future refills, please coordinate with your pharmacy. If you have not had a recent clinic visit or routine labs, for your safety, your provider may not be able to refill your prescription.     Sincerely,  AKUA Mclain,  [facility],  Division of Gastroenterology   ([facility])

## 2025-03-19 ENCOUNTER — VIRTUAL VISIT (OUTPATIENT)
Dept: GASTROENTEROLOGY | Facility: CLINIC | Age: 70
End: 2025-03-19
Attending: FAMILY MEDICINE
Payer: MEDICARE

## 2025-03-19 DIAGNOSIS — C61 MALIGNANT NEOPLASM OF PROSTATE (H): Primary | ICD-10-CM

## 2025-03-19 DIAGNOSIS — K63.9 BOWEL WALL THICKENING: ICD-10-CM

## 2025-03-19 DIAGNOSIS — F40.240 CLAUSTROPHOBIA: ICD-10-CM

## 2025-03-19 DIAGNOSIS — K80.20 ASYMPTOMATIC CHOLELITHIASIS: ICD-10-CM

## 2025-03-19 PROCEDURE — 98002 SYNCH AUDIO-VIDEO NEW MOD 45: CPT | Performed by: NURSE PRACTITIONER

## 2025-03-19 RX ORDER — DIAZEPAM 5 MG/1
5 TABLET ORAL 2 TIMES DAILY
Qty: 2 TABLET | Refills: 0 | Status: SHIPPED | OUTPATIENT
Start: 2025-03-19

## 2025-03-19 RX ORDER — DIAZEPAM 5 MG/1
5 TABLET ORAL 2 TIMES DAILY
Qty: 2 TABLET | Refills: 0 | Status: SHIPPED | OUTPATIENT
Start: 2025-03-19 | End: 2025-03-19

## 2025-04-17 ENCOUNTER — ANCILLARY PROCEDURE (OUTPATIENT)
Dept: MRI IMAGING | Facility: CLINIC | Age: 70
End: 2025-04-17
Attending: NURSE PRACTITIONER
Payer: MEDICARE

## 2025-04-17 DIAGNOSIS — K63.9 BOWEL WALL THICKENING: ICD-10-CM

## 2025-04-17 PROCEDURE — 72197 MRI PELVIS W/O & W/DYE: CPT | Performed by: STUDENT IN AN ORGANIZED HEALTH CARE EDUCATION/TRAINING PROGRAM

## 2025-04-17 PROCEDURE — 74183 MRI ABD W/O CNTR FLWD CNTR: CPT | Performed by: STUDENT IN AN ORGANIZED HEALTH CARE EDUCATION/TRAINING PROGRAM

## 2025-04-17 PROCEDURE — A9585 GADOBUTROL INJECTION: HCPCS | Mod: JW | Performed by: STUDENT IN AN ORGANIZED HEALTH CARE EDUCATION/TRAINING PROGRAM

## 2025-04-17 RX ORDER — GADOBUTROL 604.72 MG/ML
10 INJECTION INTRAVENOUS ONCE
Status: COMPLETED | OUTPATIENT
Start: 2025-04-17 | End: 2025-04-17

## 2025-04-17 RX ADMIN — GADOBUTROL 9 ML: 604.72 INJECTION INTRAVENOUS at 16:03

## 2025-07-12 ENCOUNTER — HEALTH MAINTENANCE LETTER (OUTPATIENT)
Age: 70
End: 2025-07-12

## 2025-11-10 ENCOUNTER — PRE VISIT (OUTPATIENT)
Dept: OPHTHALMOLOGY | Facility: CLINIC | Age: 70
End: 2025-11-10

## (undated) RX ORDER — LIDOCAINE HYDROCHLORIDE 20 MG/ML
JELLY TOPICAL
Status: DISPENSED
Start: 2023-02-13

## (undated) RX ORDER — LIDOCAINE HYDROCHLORIDE 20 MG/ML
JELLY TOPICAL
Status: DISPENSED
Start: 2022-02-28

## (undated) RX ORDER — LIDOCAINE HYDROCHLORIDE 20 MG/ML
JELLY TOPICAL
Status: DISPENSED
Start: 2025-02-10

## (undated) RX ORDER — LIDOCAINE HYDROCHLORIDE 20 MG/ML
JELLY TOPICAL
Status: DISPENSED
Start: 2020-12-14

## (undated) RX ORDER — GENTAMICIN 40 MG/ML
INJECTION, SOLUTION INTRAMUSCULAR; INTRAVENOUS
Status: DISPENSED
Start: 2021-10-18

## (undated) RX ORDER — LIDOCAINE HYDROCHLORIDE 10 MG/ML
INJECTION, SOLUTION EPIDURAL; INFILTRATION; INTRACAUDAL; PERINEURAL
Status: DISPENSED
Start: 2021-10-18

## (undated) RX ORDER — LIDOCAINE HYDROCHLORIDE 20 MG/ML
JELLY TOPICAL
Status: DISPENSED
Start: 2024-02-12